# Patient Record
Sex: FEMALE | Race: OTHER | NOT HISPANIC OR LATINO
[De-identification: names, ages, dates, MRNs, and addresses within clinical notes are randomized per-mention and may not be internally consistent; named-entity substitution may affect disease eponyms.]

---

## 2022-05-23 ENCOUNTER — TRANSCRIPTION ENCOUNTER (OUTPATIENT)
Age: 22
End: 2022-05-23

## 2022-05-24 ENCOUNTER — RESULT REVIEW (OUTPATIENT)
Age: 22
End: 2022-05-24

## 2022-05-24 ENCOUNTER — INPATIENT (INPATIENT)
Facility: HOSPITAL | Age: 22
LOS: 5 days | Discharge: HOME | End: 2022-05-30
Attending: OBSTETRICS & GYNECOLOGY | Admitting: OBSTETRICS & GYNECOLOGY
Payer: MEDICAID

## 2022-05-24 ENCOUNTER — TRANSCRIPTION ENCOUNTER (OUTPATIENT)
Age: 22
End: 2022-05-24

## 2022-05-24 VITALS
SYSTOLIC BLOOD PRESSURE: 122 MMHG | TEMPERATURE: 100 F | OXYGEN SATURATION: 100 % | RESPIRATION RATE: 18 BRPM | DIASTOLIC BLOOD PRESSURE: 80 MMHG | HEART RATE: 123 BPM

## 2022-05-24 DIAGNOSIS — K66.1 HEMOPERITONEUM: ICD-10-CM

## 2022-05-24 LAB
ALBUMIN SERPL ELPH-MCNC: 3 G/DL — LOW (ref 3.3–5)
ALBUMIN SERPL ELPH-MCNC: 3.2 G/DL — LOW (ref 3.3–5)
ALP SERPL-CCNC: 56 U/L — SIGNIFICANT CHANGE UP (ref 40–120)
ALP SERPL-CCNC: 64 U/L — SIGNIFICANT CHANGE UP (ref 40–120)
ALT FLD-CCNC: 10 U/L — SIGNIFICANT CHANGE UP (ref 4–33)
ALT FLD-CCNC: 9 U/L — SIGNIFICANT CHANGE UP (ref 4–33)
ANION GAP SERPL CALC-SCNC: 10 MMOL/L — SIGNIFICANT CHANGE UP (ref 7–14)
ANION GAP SERPL CALC-SCNC: 12 MMOL/L — SIGNIFICANT CHANGE UP (ref 7–14)
APPEARANCE UR: CLEAR — SIGNIFICANT CHANGE UP
APTT BLD: 26.8 SEC — LOW (ref 27–36.3)
APTT BLD: 27.5 SEC — SIGNIFICANT CHANGE UP (ref 27–36.3)
APTT BLD: 27.7 SEC — SIGNIFICANT CHANGE UP (ref 27–36.3)
AST SERPL-CCNC: 11 U/L — SIGNIFICANT CHANGE UP (ref 4–32)
AST SERPL-CCNC: 13 U/L — SIGNIFICANT CHANGE UP (ref 4–32)
BASOPHILS # BLD AUTO: 0 K/UL — SIGNIFICANT CHANGE UP (ref 0–0.2)
BASOPHILS NFR BLD AUTO: 0 % — SIGNIFICANT CHANGE UP (ref 0–2)
BILIRUB SERPL-MCNC: 0.3 MG/DL — SIGNIFICANT CHANGE UP (ref 0.2–1.2)
BILIRUB SERPL-MCNC: 1.3 MG/DL — HIGH (ref 0.2–1.2)
BILIRUB UR-MCNC: NEGATIVE — SIGNIFICANT CHANGE UP
BLD GP AB SCN SERPL QL: NEGATIVE — SIGNIFICANT CHANGE UP
BLOOD GAS ARTERIAL - LYTES,HGB,ICA,LACT RESULT: SIGNIFICANT CHANGE UP
BLOOD GAS ARTERIAL COMPREHENSIVE RESULT: SIGNIFICANT CHANGE UP
BLOOD GAS VENOUS COMPREHENSIVE RESULT: SIGNIFICANT CHANGE UP
BUN SERPL-MCNC: 6 MG/DL — LOW (ref 7–23)
BUN SERPL-MCNC: 9 MG/DL — SIGNIFICANT CHANGE UP (ref 7–23)
CALCIUM SERPL-MCNC: 8.3 MG/DL — LOW (ref 8.4–10.5)
CALCIUM SERPL-MCNC: 8.5 MG/DL — SIGNIFICANT CHANGE UP (ref 8.4–10.5)
CHLORIDE SERPL-SCNC: 105 MMOL/L — SIGNIFICANT CHANGE UP (ref 98–107)
CHLORIDE SERPL-SCNC: 107 MMOL/L — SIGNIFICANT CHANGE UP (ref 98–107)
CO2 SERPL-SCNC: 18 MMOL/L — LOW (ref 22–31)
CO2 SERPL-SCNC: 20 MMOL/L — LOW (ref 22–31)
COLOR SPEC: YELLOW — SIGNIFICANT CHANGE UP
CREAT SERPL-MCNC: 0.55 MG/DL — SIGNIFICANT CHANGE UP (ref 0.5–1.3)
CREAT SERPL-MCNC: 0.6 MG/DL — SIGNIFICANT CHANGE UP (ref 0.5–1.3)
D DIMER BLD IA.RAPID-MCNC: 8219 NG/ML DDU — SIGNIFICANT CHANGE UP
DIFF PNL FLD: NEGATIVE — SIGNIFICANT CHANGE UP
EGFR: 129 ML/MIN/1.73M2 — SIGNIFICANT CHANGE UP
EGFR: 132 ML/MIN/1.73M2 — SIGNIFICANT CHANGE UP
EOSINOPHIL # BLD AUTO: 0 K/UL — SIGNIFICANT CHANGE UP (ref 0–0.5)
EOSINOPHIL NFR BLD AUTO: 0 % — SIGNIFICANT CHANGE UP (ref 0–6)
FIBRINOGEN PPP-MCNC: 846 MG/DL — HIGH (ref 330–520)
FLUAV AG NPH QL: SIGNIFICANT CHANGE UP
FLUBV AG NPH QL: SIGNIFICANT CHANGE UP
GLUCOSE SERPL-MCNC: 196 MG/DL — HIGH (ref 70–99)
GLUCOSE SERPL-MCNC: 88 MG/DL — SIGNIFICANT CHANGE UP (ref 70–99)
GLUCOSE UR QL: NEGATIVE — SIGNIFICANT CHANGE UP
HCG SERPL-ACNC: <5 MIU/ML — SIGNIFICANT CHANGE UP
HCT VFR BLD CALC: 25.7 % — LOW (ref 34.5–45)
HCT VFR BLD CALC: 30.9 % — LOW (ref 34.5–45)
HGB BLD-MCNC: 7.4 G/DL — LOW (ref 11.5–15.5)
HGB BLD-MCNC: 9.5 G/DL — LOW (ref 11.5–15.5)
IANC: 14.36 K/UL — HIGH (ref 1.8–7.4)
INR BLD: 1.51 RATIO — HIGH (ref 0.88–1.16)
INR BLD: 1.58 RATIO — HIGH (ref 0.88–1.16)
INR BLD: 1.63 RATIO — HIGH (ref 0.88–1.16)
KETONES UR-MCNC: ABNORMAL
LEUKOCYTE ESTERASE UR-ACNC: NEGATIVE — SIGNIFICANT CHANGE UP
LYMPHOCYTES # BLD AUTO: 0.43 K/UL — LOW (ref 1–3.3)
LYMPHOCYTES # BLD AUTO: 2.6 % — LOW (ref 13–44)
MAGNESIUM SERPL-MCNC: 1.6 MG/DL — SIGNIFICANT CHANGE UP (ref 1.6–2.6)
MCHC RBC-ENTMCNC: 19.8 PG — LOW (ref 27–34)
MCHC RBC-ENTMCNC: 22.7 PG — LOW (ref 27–34)
MCHC RBC-ENTMCNC: 28.8 GM/DL — LOW (ref 32–36)
MCHC RBC-ENTMCNC: 30.7 GM/DL — LOW (ref 32–36)
MCV RBC AUTO: 68.9 FL — LOW (ref 80–100)
MCV RBC AUTO: 73.9 FL — LOW (ref 80–100)
MONOCYTES # BLD AUTO: 1 K/UL — HIGH (ref 0–0.9)
MONOCYTES NFR BLD AUTO: 6.1 % — SIGNIFICANT CHANGE UP (ref 2–14)
NEUTROPHILS # BLD AUTO: 15 K/UL — HIGH (ref 1.8–7.4)
NEUTROPHILS NFR BLD AUTO: 91.3 % — HIGH (ref 43–77)
NITRITE UR-MCNC: NEGATIVE — SIGNIFICANT CHANGE UP
NRBC # BLD: 0 /100 WBCS — SIGNIFICANT CHANGE UP
NRBC # FLD: 0 K/UL — SIGNIFICANT CHANGE UP
PH UR: 6.5 — SIGNIFICANT CHANGE UP (ref 5–8)
PHOSPHATE SERPL-MCNC: 3.2 MG/DL — SIGNIFICANT CHANGE UP (ref 2.5–4.5)
PLATELET # BLD AUTO: 627 K/UL — HIGH (ref 150–400)
PLATELET # BLD AUTO: 647 K/UL — HIGH (ref 150–400)
POTASSIUM SERPL-MCNC: 3.4 MMOL/L — LOW (ref 3.5–5.3)
POTASSIUM SERPL-MCNC: 4 MMOL/L — SIGNIFICANT CHANGE UP (ref 3.5–5.3)
POTASSIUM SERPL-SCNC: 3.4 MMOL/L — LOW (ref 3.5–5.3)
POTASSIUM SERPL-SCNC: 4 MMOL/L — SIGNIFICANT CHANGE UP (ref 3.5–5.3)
PROT SERPL-MCNC: 6.3 G/DL — SIGNIFICANT CHANGE UP (ref 6–8.3)
PROT SERPL-MCNC: 6.7 G/DL — SIGNIFICANT CHANGE UP (ref 6–8.3)
PROT UR-MCNC: ABNORMAL
PROTHROM AB SERPL-ACNC: 17.6 SEC — HIGH (ref 10.5–13.4)
PROTHROM AB SERPL-ACNC: 18.4 SEC — HIGH (ref 10.5–13.4)
PROTHROM AB SERPL-ACNC: 19 SEC — HIGH (ref 10.5–13.4)
RBC # BLD: 3.73 M/UL — LOW (ref 3.8–5.2)
RBC # BLD: 4.18 M/UL — SIGNIFICANT CHANGE UP (ref 3.8–5.2)
RBC # FLD: 21.3 % — HIGH (ref 10.3–14.5)
RBC # FLD: 21.6 % — HIGH (ref 10.3–14.5)
RH IG SCN BLD-IMP: POSITIVE — SIGNIFICANT CHANGE UP
RSV RNA NPH QL NAA+NON-PROBE: SIGNIFICANT CHANGE UP
SARS-COV-2 RNA SPEC QL NAA+PROBE: SIGNIFICANT CHANGE UP
SODIUM SERPL-SCNC: 135 MMOL/L — SIGNIFICANT CHANGE UP (ref 135–145)
SODIUM SERPL-SCNC: 137 MMOL/L — SIGNIFICANT CHANGE UP (ref 135–145)
SP GR SPEC: 1.04 — SIGNIFICANT CHANGE UP (ref 1–1.05)
THROMBIN TIME: 21.4 SEC — SIGNIFICANT CHANGE UP (ref 16–26)
UROBILINOGEN FLD QL: SIGNIFICANT CHANGE UP
WBC # BLD: 16.43 K/UL — HIGH (ref 3.8–10.5)
WBC # BLD: 19.54 K/UL — HIGH (ref 3.8–10.5)
WBC # FLD AUTO: 16.43 K/UL — HIGH (ref 3.8–10.5)
WBC # FLD AUTO: 19.54 K/UL — HIGH (ref 3.8–10.5)

## 2022-05-24 PROCEDURE — 99291 CRITICAL CARE FIRST HOUR: CPT

## 2022-05-24 PROCEDURE — 99221 1ST HOSP IP/OBS SF/LOW 40: CPT

## 2022-05-24 PROCEDURE — 71045 X-RAY EXAM CHEST 1 VIEW: CPT | Mod: 26

## 2022-05-24 PROCEDURE — 58720 REMOVAL OF OVARY/TUBE(S): CPT | Mod: 22

## 2022-05-24 PROCEDURE — 88305 TISSUE EXAM BY PATHOLOGIST: CPT | Mod: 26

## 2022-05-24 PROCEDURE — 58925 REMOVAL OF OVARIAN CYST(S): CPT | Mod: GC,22

## 2022-05-24 PROCEDURE — 49205: CPT | Mod: 22

## 2022-05-24 PROCEDURE — 74174 CTA ABD&PLVS W/CONTRAST: CPT | Mod: 26,MA

## 2022-05-24 RX ORDER — PIPERACILLIN AND TAZOBACTAM 4; .5 G/20ML; G/20ML
3.38 INJECTION, POWDER, LYOPHILIZED, FOR SOLUTION INTRAVENOUS EVERY 8 HOURS
Refills: 0 | Status: DISCONTINUED | OUTPATIENT
Start: 2022-05-24 | End: 2022-05-26

## 2022-05-24 RX ORDER — CHLORHEXIDINE GLUCONATE 213 G/1000ML
1 SOLUTION TOPICAL
Refills: 0 | Status: DISCONTINUED | OUTPATIENT
Start: 2022-05-24 | End: 2022-05-28

## 2022-05-24 RX ORDER — PIPERACILLIN AND TAZOBACTAM 4; .5 G/20ML; G/20ML
3.38 INJECTION, POWDER, LYOPHILIZED, FOR SOLUTION INTRAVENOUS ONCE
Refills: 0 | Status: COMPLETED | OUTPATIENT
Start: 2022-05-24 | End: 2022-05-24

## 2022-05-24 RX ORDER — FENTANYL CITRATE 50 UG/ML
0.5 INJECTION INTRAVENOUS
Qty: 2500 | Refills: 0 | Status: DISCONTINUED | OUTPATIENT
Start: 2022-05-24 | End: 2022-05-25

## 2022-05-24 RX ORDER — HEPARIN SODIUM 5000 [USP'U]/ML
5000 INJECTION INTRAVENOUS; SUBCUTANEOUS EVERY 8 HOURS
Refills: 0 | Status: DISCONTINUED | OUTPATIENT
Start: 2022-05-24 | End: 2022-05-30

## 2022-05-24 RX ORDER — POTASSIUM CHLORIDE 20 MEQ
10 PACKET (EA) ORAL
Refills: 0 | Status: COMPLETED | OUTPATIENT
Start: 2022-05-24 | End: 2022-05-25

## 2022-05-24 RX ORDER — MAGNESIUM SULFATE 500 MG/ML
2 VIAL (ML) INJECTION ONCE
Refills: 0 | Status: DISCONTINUED | OUTPATIENT
Start: 2022-05-24 | End: 2022-05-25

## 2022-05-24 RX ORDER — SODIUM CHLORIDE 9 MG/ML
1000 INJECTION, SOLUTION INTRAVENOUS
Refills: 0 | Status: DISCONTINUED | OUTPATIENT
Start: 2022-05-24 | End: 2022-05-27

## 2022-05-24 RX ORDER — DEXMEDETOMIDINE HYDROCHLORIDE IN 0.9% SODIUM CHLORIDE 4 UG/ML
0.2 INJECTION INTRAVENOUS
Qty: 400 | Refills: 0 | Status: DISCONTINUED | OUTPATIENT
Start: 2022-05-24 | End: 2022-05-25

## 2022-05-24 RX ORDER — ACETAMINOPHEN 500 MG
1000 TABLET ORAL EVERY 6 HOURS
Refills: 0 | Status: COMPLETED | OUTPATIENT
Start: 2022-05-24 | End: 2022-05-25

## 2022-05-24 RX ORDER — HYDROMORPHONE HYDROCHLORIDE 2 MG/ML
0.5 INJECTION INTRAMUSCULAR; INTRAVENOUS; SUBCUTANEOUS EVERY 4 HOURS
Refills: 0 | Status: DISCONTINUED | OUTPATIENT
Start: 2022-05-24 | End: 2022-05-25

## 2022-05-24 RX ORDER — MORPHINE SULFATE 50 MG/1
2 CAPSULE, EXTENDED RELEASE ORAL ONCE
Refills: 0 | Status: DISCONTINUED | OUTPATIENT
Start: 2022-05-24 | End: 2022-05-24

## 2022-05-24 RX ORDER — SODIUM CHLORIDE 9 MG/ML
500 INJECTION INTRAMUSCULAR; INTRAVENOUS; SUBCUTANEOUS ONCE
Refills: 0 | Status: COMPLETED | OUTPATIENT
Start: 2022-05-24 | End: 2022-05-24

## 2022-05-24 RX ORDER — HYDROMORPHONE HYDROCHLORIDE 2 MG/ML
1 INJECTION INTRAMUSCULAR; INTRAVENOUS; SUBCUTANEOUS EVERY 4 HOURS
Refills: 0 | Status: DISCONTINUED | OUTPATIENT
Start: 2022-05-24 | End: 2022-05-25

## 2022-05-24 RX ORDER — CHLORHEXIDINE GLUCONATE 213 G/1000ML
15 SOLUTION TOPICAL EVERY 12 HOURS
Refills: 0 | Status: DISCONTINUED | OUTPATIENT
Start: 2022-05-24 | End: 2022-05-25

## 2022-05-24 RX ADMIN — FENTANYL CITRATE 4.93 MICROGRAM(S)/KG/HR: 50 INJECTION INTRAVENOUS at 22:49

## 2022-05-24 RX ADMIN — PIPERACILLIN AND TAZOBACTAM 200 GRAM(S): 4; .5 INJECTION, POWDER, LYOPHILIZED, FOR SOLUTION INTRAVENOUS at 14:22

## 2022-05-24 RX ADMIN — PIPERACILLIN AND TAZOBACTAM 25 GRAM(S): 4; .5 INJECTION, POWDER, LYOPHILIZED, FOR SOLUTION INTRAVENOUS at 23:49

## 2022-05-24 RX ADMIN — SODIUM CHLORIDE 500 MILLILITER(S): 9 INJECTION INTRAMUSCULAR; INTRAVENOUS; SUBCUTANEOUS at 14:00

## 2022-05-24 RX ADMIN — MORPHINE SULFATE 2 MILLIGRAM(S): 50 CAPSULE, EXTENDED RELEASE ORAL at 14:18

## 2022-05-24 RX ADMIN — DEXMEDETOMIDINE HYDROCHLORIDE IN 0.9% SODIUM CHLORIDE 4.93 MICROGRAM(S)/KG/HR: 4 INJECTION INTRAVENOUS at 23:49

## 2022-05-24 RX ADMIN — FENTANYL CITRATE 4.93 MICROGRAM(S)/KG/HR: 50 INJECTION INTRAVENOUS at 23:49

## 2022-05-24 RX ADMIN — Medication 400 MILLIGRAM(S): at 23:48

## 2022-05-24 RX ADMIN — SODIUM CHLORIDE 125 MILLILITER(S): 9 INJECTION, SOLUTION INTRAVENOUS at 23:49

## 2022-05-24 NOTE — H&P ADULT - ATTENDING COMMENTS
Saw patient with resident. Patient is tachycardic and SOB and very uncomfortable  Explained to patient and her mother need to do diagnostic laparoscopy to see what is in the abdomen either purulent fluid or blood and might need anywhere from cystectomy to oophorectomy and salpingectomy to full hysterectomy. Patient consented and understands the need for urgent surgery.  To start transfusing 1 unit on way to OR

## 2022-05-24 NOTE — CONSULT NOTE ADULT - SUBJECTIVE AND OBJECTIVE BOX
SICU Consultation Note  =====================================================    HPI:  21 yo woman presents for second opinion after signing out AMA from [facility] where she presented on  with abdominal pain and fevers, and admitted for treatment of TOA. Per discussion with [facility] GYN team, pt was started on Abx and plan was initially for IR drainage of TOA. Patient had an MRI done due to elevated , which redemonstrated TOA. However, due to elevated , IR drainage not performed and patient was counseled on surgical intervention. Patient was told she may need removal of her reproductive organs and patient left AMA for a second opinion. Also concern for hemorrhagic cysts due to drop in Hgb from 9.6 -> 7.5 while inpatient. Patient denies needing blood transfusion. Pt reports abdominal pain started two days ago, associated with fevers, chills, nausea and she presented to [place] on  due to worsening pain. Denies vaginal bleeding, discharge, lightheadedness, dizziness, CP, SOB.  She is sexually active. Reports hx of heavy menstrual bleeding requiring blood transfusions and irregular periods, managed on OCPs. In ED, patient tachycardic to 120s, tmax 100.3 with CT angio showing bilateral adnexal cysts, likely hemorrhagic on the right with a right adnexal lesion measuring 11x5.7cm and free fluid in the pelvis concerning for ruptured TOA. No active extrav. Labs showed WBC 16, H&H 7.4/25, INR 1.6 and lactate 1.3. She was taken emergently to the OR by GYN and is now s/p **********     Surgery Information  OR time:      EBL:          IV Fluids:       Blood Products:   UOP:        PAST MEDICAL & SURGICAL HISTORY:  Abnormal uterine bleeding    HOME MEDS:  None    ALLERGIES:  NKDA    SOCIAL:   ADVANCED DIRECTIVES: Presumed Full Code     REVIEW OF SYSTEMS:  [ ] A ten-point review of systems was otherwise negative except as noted.  [x] Due to altered mental status/intubation, subjective information were not able to be obtained from the patient. History was obtained, to the extent possible, from review of the chart and collateral sources of information.      CURRENT MEDICATIONS:   --------------------------------------------------------------------------------------  Neurologic Medications    Respiratory Medications    Cardiovascular Medications    Gastrointestinal Medications    Genitourinary Medications    Hematologic/Oncologic Medications    Antimicrobial/Immunologic Medications    Endocrine/Metabolic Medications    Topical/Other Medications    --------------------------------------------------------------------------------------    VITAL SIGNS, INS/OUTS (last 24 hours):  --------------------------------------------------------------------------------------  ICU Vital Signs Last 24 Hrs  T(C): 37.9 (24 May 2022 12:39), Max: 37.9 (24 May 2022 12:39)  T(F): 100.3 (24 May 2022 12:39), Max: 100.3 (24 May 2022 12:39)  HR: 123 (24 May 2022 12:39) (123 - 123)  BP: 122/80 (24 May 2022 12:39) (122/80 - 122/80)  RR: 18 (24 May 2022 12:39) (18 - 18)  SpO2: 100% (24 May 2022 12:39) (100% - 100%)    --------------------------------------------------------------------------------------    EXAM:    General/Neuro  RASS:   Exam: NAD, sedated    Respiratory  Exam:  Normal expansion/effort.  [] Tracheostomy   [x] Intubated  Mechanical Ventilation:     Cardiovascular  Exam: S1, S2.  Regular rate and rhythm.   Cardiac Rhythm: Normal Sinus Rhythm    GI  Exam: Abdomen soft, non-distended.    Wound:   ***  Current Diet:  NPO***    Tubes/Lines/Drains  ***  [x] Peripheral IV  [] Central Venous Line     	[] R	[] L	[] IJ	[] Fem	[] SC        Type:	    Date Placed:   [x] Arterial Line		[] R	[] L	[] Fem	[] Rad	[] Ax	Date Placed:   [] PICC:         	[] Midline		[] Mediport           [x] Urinary Catheter		Date Placed:     Extremities  Exam: Extremities warm, pink, well-perfused.      Derm:  Exam: Good skin turgor, no skin breakdown.      :   Exam: Harden catheter in place.     --------------------------------------------------------------------------------------  Labs:                        7.4    16.43 )-----------( 647      ( 24 May 2022 14:00 )             25.7       Auto Neutrophil %: 91.3 % (22 @ 14:00)        137  |  107  |  9   ----------------------------<  88  4.0   |  20<L>  |  0.60      Calcium, Total Serum: 8.3 mg/dL (22 @ 14:00)      LFTs:             6.7  | 0.3  | 11       ------------------[64      ( 24 May 2022 14:00 )  3.0  | x    | 10          Blood Gas Arterial, Lactate: 0.8 mmol/L (22 @ 19:45)  Blood Gas Arterial, Lactate: 1.3 mmol/L (22 @ 18:06)  Blood Gas Arterial, Lactate: 1.0 mmol/L (22 @ 17:14)  Blood Gas Venous - Lactate: 0.9 mmol/L (22 @ 14:00)    ABG - ( 24 May 2022 19:45 )  pH: 7.28  /  pCO2: 41    /  pO2: 253   / HCO3: 19    / Base Excess: -7.0  /  SaO2: 100.0     ABG - ( 24 May 2022 18:06 )  pH: 7.34  /  pCO2: 33    /  pO2: 308   / HCO3: 18    / Base Excess: -7.2  /  SaO2: 100.0     ABG - ( 24 May 2022 17:14 )  pH: 7.39  /  pCO2: 31    /  pO2: 221   / HCO3: 19    / Base Excess: -5.6  /  SaO2: 99.1      Coags:     18.4   ----< 1.58    ( 24 May 2022 19:15 )     27.7      Urinalysis Basic - ( 24 May 2022 14:25 )    Color: Yellow / Appearance: Clear / S.042 / pH: x  Gluc: x / Ketone: Moderate  / Bili: Negative / Urobili: <2 mg/dL   Blood: x / Protein: 30 mg/dL / Nitrite: Negative   Leuk Esterase: Negative / RBC: 9 /HPF / WBC 3 /HPF   Sq Epi: x / Non Sq Epi: 3 /HPF / Bacteria: Negative           SICU Consultation Note  =====================================================    HPI:  23 yo woman presents for second opinion after signing out AMA from [facility] where she presented on  with abdominal pain and fevers, and admitted for treatment of TOA. Per discussion with [facility] GYN team, pt was started on Abx and plan was initially for IR drainage of TOA. Patient had an MRI done due to elevated , which redemonstrated TOA. However, due to elevated , IR drainage not performed and patient was counseled on surgical intervention. Patient was told she may need removal of her reproductive organs and patient left AMA for a second opinion. Also concern for hemorrhagic cysts due to drop in Hgb from 9.6 -> 7.5 while inpatient. Patient denies needing blood transfusion. Pt reports abdominal pain started two days ago, associated with fevers, chills, nausea and she presented to [place] on  due to worsening pain. Denies vaginal bleeding, discharge, lightheadedness, dizziness, CP, SOB.  She is sexually active. Reports hx of heavy menstrual bleeding requiring blood transfusions and irregular periods, managed on OCPs. In ED, patient tachycardic to 120s, tmax 100.3 with CT angio showing bilateral adnexal cysts, likely hemorrhagic on the right with a right adnexal lesion measuring 11x5.7cm and free fluid in the pelvis concerning for ruptured TOA. No active extrav. Labs showed WBC 16, H&H 7.4/25, INR 1.6 and lactate 1.3. She was taken emergently to the OR by GYN and is now s/p diagnostic laparoscopy, exploratory laparotomy, right salpingo-oopherectomy, left cystectomy and evacuation of 1L purulence from abdomen. Patient desatted to 50s post-intubated and was intermittently on pressors intraoperatively. She was left intubated and brought to the SICU for further resuscitation and ventilatory support.    Surgery Information  EBL 200ml  IVF 2900ml  Albumin 500ml  UOP 830ml  2u PRBC  2u cryoprecipitate    PAST MEDICAL & SURGICAL HISTORY:  Abnormal uterine bleeding    HOME MEDS:  None    ALLERGIES:  NKDA    SOCIAL:   ADVANCED DIRECTIVES: Presumed Full Code     REVIEW OF SYSTEMS:  [ ] A ten-point review of systems was otherwise negative except as noted.  [x] Due to altered mental status/intubation, subjective information were not able to be obtained from the patient. History was obtained, to the extent possible, from review of the chart and collateral sources of information.      CURRENT MEDICATIONS:   --------------------------------------------------------------------------------------  Neurologic Medications    Respiratory Medications    Cardiovascular Medications    Gastrointestinal Medications    Genitourinary Medications    Hematologic/Oncologic Medications    Antimicrobial/Immunologic Medications    Endocrine/Metabolic Medications    Topical/Other Medications    --------------------------------------------------------------------------------------    VITAL SIGNS, INS/OUTS (last 24 hours):  --------------------------------------------------------------------------------------  ICU Vital Signs Last 24 Hrs  T(C): 37.9 (24 May 2022 12:39), Max: 37.9 (24 May 2022 12:39)  T(F): 100.3 (24 May 2022 12:39), Max: 100.3 (24 May 2022 12:39)  HR: 123 (24 May 2022 12:39) (123 - 123)  BP: 122/80 (24 May 2022 12:39) (122/80 - 122/80)  RR: 18 (24 May 2022 12:39) (18 - 18)  SpO2: 100% (24 May 2022 12:39) (100% - 100%)    --------------------------------------------------------------------------------------    EXAM:    General/Neuro  RASS: -3  Exam: NAD, sedated    Respiratory  Exam:  Normal expansion/effort.  [] Tracheostomy   [x] Intubated  Mechanical Ventilation: volume a/c 400/15/8/50    Cardiovascular  Exam: S1, S2.  Sinus tachycardia  Cardiac Rhythm: Normal Sinus Rhythm    GI  Exam: Abdomen soft, non-distended. Midline incision with dressing c/d/i  Current Diet:  NPO/OGT    Tubes/Lines/Drains    [x] Peripheral IV  [] Central Venous Line     	[] R	[] L	[] IJ	[] Fem	[] SC        Type:	    Date Placed:   [x] Arterial Line		[x] L	[] Fem	[x] Rad	[] Ax	Date Placed:   [] PICC:         	[] Midline		[] Mediport           [x] Urinary Catheter		Date Placed:     Extremities  Exam: Extremities warm, pink, well-perfused.      Derm:  Exam: Good skin turgor, no skin breakdown.      :   Exam: Harden catheter in place.     --------------------------------------------------------------------------------------  Labs:                        7.4    16.43 )-----------( 647      ( 24 May 2022 14:00 )             25.7       Auto Neutrophil %: 91.3 % (22 @ 14:00)        137  |  107  |  9   ----------------------------<  88  4.0   |  20<L>  |  0.60      Calcium, Total Serum: 8.3 mg/dL (22 @ 14:00)      LFTs:             6.7  | 0.3  | 11       ------------------[64      ( 24 May 2022 14:00 )  3.0  | x    | 10          Blood Gas Arterial, Lactate: 0.8 mmol/L (22 @ 19:45)  Blood Gas Arterial, Lactate: 1.3 mmol/L (22 @ 18:06)  Blood Gas Arterial, Lactate: 1.0 mmol/L (22 @ 17:14)  Blood Gas Venous - Lactate: 0.9 mmol/L (22 @ 14:00)    ABG - ( 24 May 2022 19:45 )  pH: 7.28  /  pCO2: 41    /  pO2: 253   / HCO3: 19    / Base Excess: -7.0  /  SaO2: 100.0     ABG - ( 24 May 2022 18:06 )  pH: 7.34  /  pCO2: 33    /  pO2: 308   / HCO3: 18    / Base Excess: -7.2  /  SaO2: 100.0     ABG - ( 24 May 2022 17:14 )  pH: 7.39  /  pCO2: 31    /  pO2: 221   / HCO3: 19    / Base Excess: -5.6  /  SaO2: 99.1      Coags:     18.4   ----< 1.58    ( 24 May 2022 19:15 )     27.7      Urinalysis Basic - ( 24 May 2022 14:25 )    Color: Yellow / Appearance: Clear / S.042 / pH: x  Gluc: x / Ketone: Moderate  / Bili: Negative / Urobili: <2 mg/dL   Blood: x / Protein: 30 mg/dL / Nitrite: Negative   Leuk Esterase: Negative / RBC: 9 /HPF / WBC 3 /HPF   Sq Epi: x / Non Sq Epi: 3 /HPF / Bacteria: Negative

## 2022-05-24 NOTE — CONSULT NOTE ADULT - ATTENDING COMMENTS
I have examined this patient, reviewed pertinent labs and imaging on SICU rounds.    40  minutes in total were spent in providing direct critical care for the diagnoses, assessment and plan outlined below.  This patient suffers from a critical illness that acutely impairs one or more vital organ systems such that there is a high probability of life threatening or imminent deterioration of the patient's condition.  These diagnoses are unrelated to the surgical procedure.    Additionally, time spent in teaching or the performance of separately billable procedures was not counted toward my critical care time.  There is no overlap.  Time included review of vitals, labs, imaging, discussion with consultants/surrogate decision-makers, and coordination with the operating room.    22F with septic shock due to tuboovarian abscesses requiring postoperative resuscitation and ventilator management.  Optimize sedation to vent synchrony.  Assess fluid status with POCUS and administer pressors I have examined this patient, reviewed pertinent labs and imaging on SICU rounds.    40  minutes in total were spent in providing direct critical care for the diagnoses, assessment and plan outlined below.  This patient suffers from a critical illness that acutely impairs one or more vital organ systems such that there is a high probability of life threatening or imminent deterioration of the patient's condition.  These diagnoses are unrelated to the surgical procedure.    Additionally, time spent in teaching or the performance of separately billable procedures was not counted toward my critical care time.  There is no overlap.  Time included review of vitals, labs, imaging, discussion with consultants/surrogate decision-makers, and coordination with the operating room.    22F with septic shock due to tuboovarian abscesses 2/2 infected endometriomas requiring postoperative resuscitation and ventilator management.  Optimize sedation to vent synchrony; ideally fentanyl infusion for pain control and minimize propofol/precedex for hypotension.  Adjust oxygenation support parameters to SaO2>94%, check CXR and assess for readiness for SBT in 6 hrs.  Assess fluid status with POCUS and administer pressors as needed.  Antibiotics.  VTE prophylaxis.

## 2022-05-24 NOTE — ED PROVIDER NOTE - OBJECTIVE STATEMENT
21 yo F PMHx of, presenting to ED from [facility] after leaving from inpatient admission - states she wanted a 2nd opinion regarding management because she was told one of her fallopian tubes would have to be removed. Admitted to [place] 3 days ago for abd pain, worsening over 3-4 weeks. Described as cramping. Pt found to have ovarian abscess x 2 and free fluid on imaging there. Associated back pain. Denies vaginal bleeding or discharge. Previously w/ vomiting 3 days ago, none since. Also reports pain radiating to chest w/ breathing, fevers, and generalized weakness. LMP 5/1/22. No past abd surgeries. NKDA. 21 yo F PMHx of, presenting to ED from [facility] after leaving from inpatient admission for tubo-ovarian abscess - states she wanted a 2nd opinion regarding management because she was told one of her fallopian tubes would have to be removed. Admitted to [place] 3 days ago for abd pain, worsening over 3-4 weeks. Described as cramping. Also reports pain radiating to chest w/ breathing, back pain, fevers, and generalized weakness. Found to have tubo-ovarian abscess x 2 w/ free fluid on CT and MRI. Also found to have free fluid present today at which point surgery was suggested. Pt denies vaginal bleeding or discharge. Previously w/ vomiting 3 days ago, none since. LMP 5/1/22. No past abd surgeries. NKDA. Sexually active w/ single partner.

## 2022-05-24 NOTE — CONSULT NOTE ADULT - ASSESSMENT
21 yo woman admitted with a ruptured tubo-ovarian abscess now s/p *********    PLAN:    NEURO:  - AOx  - Pain control with     RESP:  - Intubated    CV:  - MAP goal >65    GI:  - Diet:   - Bowel regimen    :  - Trend electrolytes  - Monitor UOP    ID:  -     HEME:  - DVT ppx with  - Trend H&H    ENDO:  - Monitor glucose on BMP    TUBES/LINES/DRAINS:  -     DISPO: SICU   21 yo woman admitted with a ruptured tubo-ovarian abscess now s/p diagnostic laparoscopy, exploratory laparotomy, right salpingo-oopherectomy, left cystectomy and evacuation of 1L purulence from abdomen.     PLAN:    NEURO:  - AOx3 at baseline, currently sedated  - Will wean propofol and transition to fentanyl gtt for pain control and sedation  - IV tylenol ATC and dilaudid IVP PRN    RESP:  - Intubated, volume A/C 400/18/5/40  - On arrival to SICU, gas with respiratory and metabolic acidosis, satting well -> increased RR to 18, decreased PEEP to 5 and FiO2 to 40%  - STAT CXR to confirm ETT  - AM CXR  - R pleural effusion on CT chest, will continue to monitor    CV:  - MAP goal >65  - Levo titrated off as propofol was discontinued  - Euvolemic on POCUS  - Lactate uptrended to 3.2, will trend and give additional IVF if needed    GI:  - Diet: NPO  - OGT to LCWS, will confirm position on CXR    :  - Trend electrolytes  - Monitor UOP  - Indwelling chowdhury catheter  - LR@125ml/hr    ID:  - OR cultures sent, will f/u results  - BCx 5/24 collected  - UA negative, UCx 5/24 collected  - GC/CT pending  - Continue zosyn    HEME:  - DVT ppx with SQH  - H&H stable  - s/p 2u PRBC, 2u cryo intraop    ENDO:  - Monitor glucose on BMP    TUBES/LINES/DRAINS:  - PIVs  - Chowdhury  - ETT  - L radial a-line    DISPO: SICU   23 yo woman admitted with a ruptured tubo-ovarian abscess now s/p diagnostic laparoscopy, exploratory laparotomy, right salpingo-oopherectomy, left cystectomy and evacuation of 1L purulence from abdomen (5/24).    PLAN:    NEURO:  - AOx3 at baseline, currently sedated  - Will wean propofol and transition to fentanyl gtt for pain control and sedation  - IV tylenol ATC and dilaudid IVP PRN    RESP:  - Intubated, volume A/C 400/18/5/40  - On arrival to SICU, gas with respiratory and metabolic acidosis, satting well -> increased RR to 18, decreased PEEP to 5 and FiO2 to 40%  - STAT CXR to confirm ETT  - AM CXR  - R pleural effusion on CT chest, will continue to monitor    CV:  - MAP goal >65  - Levo titrated off as propofol was discontinued  - Euvolemic on POCUS  - Lactate uptrended to 3.2, will trend and give additional IVF if needed    GI:  - Diet: NPO  - OGT to LCWS, will confirm position on CXR    :  - Trend electrolytes  - Monitor UOP  - Indwelling chowdhury catheter  - LR@125ml/hr    ID:  - OR cultures sent, will f/u results  - BCx 5/24 collected  - UA negative, UCx 5/24 collected  - GC/CT pending  - Continue zosyn    HEME:  - DVT ppx with SQH  - H&H stable  - s/p 2u PRBC, 2u cryo intraop    ENDO:  - Monitor glucose on BMP    TUBES/LINES/DRAINS:  - PIVs  - Chowdhury  - ETT  - L radial a-line    DISPO: SICU

## 2022-05-24 NOTE — H&P ADULT - NSHPPHYSICALEXAM_GEN_ALL_CORE
General: sitting comfortably in bed, NAD, AOx3   CV: tachycardic   Lungs: nonlabored breathing    Abd: Obese, distended, diffusely tender      Pelvic: to be performed   Ext: non-tender b/l, no edema

## 2022-05-24 NOTE — ED PROVIDER NOTE - ATTENDING CONTRIBUTION TO CARE
22 year old female came to the ED from [facility] after she left because she wanted a second opinion here for OA. Pt with abd pain, fever and generalized weakness. Peritoneal signs on exam. GYN consulted and pt to be admitted

## 2022-05-24 NOTE — ED ADULT NURSE NOTE - OBJECTIVE STATEMENT
Pt A&Ox4, ambulatory at baseline. Pt presents to ED with abd pain and distension, febrile, tachycardic, and tachypneic. ABD pain described as diffuse and 8/10; abd tender but not firm. Pt c/o lower back pain. PMHx of PCOS, pt takes BC pills and her LMP was 5/1. Pt went to [facility], the MDs there told her she needed surgery. Pt came to [facility] for a second opinion. 2 20G IVs placed above L and R ACs, labs drawn and sent, family at bedside. Medication given per MD. OB consult in progress. Awaiting results. Pt to CT.

## 2022-05-24 NOTE — H&P ADULT - NSHPLABSRESULTS_GEN_ALL_CORE
Vital Signs Last 24 Hrs  T(C): 37.9 (24 May 2022 12:39), Max: 37.9 (24 May 2022 12:39)  T(F): 100.3 (24 May 2022 12:39), Max: 100.3 (24 May 2022 12:39)  HR: 123 (24 May 2022 12:39) (123 - 123)  BP: 122/80 (24 May 2022 12:39) (122/80 - 122/80)  BP(mean): --  RR: 18 (24 May 2022 12:39) (18 - 18)  SpO2: 100% (24 May 2022 12:39) (100% - 100%)                          7.4    16.43 )-----------( 647      ( 24 May 2022 14:00 )             25.7           PT/INR - ( 24 May 2022 14:00 )   PT: 19.0 sec;   INR: 1.63 ratio         PTT - ( 24 May 2022 14:00 )  PTT:27.5 sec

## 2022-05-24 NOTE — BRIEF OPERATIVE NOTE - NSICDXBRIEFPOSTOP_GEN_ALL_CORE_FT
POST-OP DIAGNOSIS:  Tubal ovarian abscess 24-May-2022 21:58:30  Chau Self  Endometriosis 24-May-2022 21:58:37  Chau Self

## 2022-05-24 NOTE — H&P ADULT - HISTORY OF PRESENT ILLNESS
23 yo G0 LMP 5/1 presents for second opinion after signing out AMA from [facility] where she presented on 5/22 with abdominal pain and fevers, and admitted for treatment of TOA.      Per discussion with [facility] GYN team, pt was started on Abx and plan was initially for IR drainage of TOA. Patient had an MRI done due to elevated , which redemonstrated TOA. However, due to elevated , IR drainage not performed and patient was counseled on surgical intervention. Patient was told she may need removal of her reproductive organs and patient left AMA for a second opinion. Also concern for hemorrhagic cysts due to drop in Hgb from 9.6 -> 7.5 while inpatient. Patient denies needing blood transfusion.     Pt reports abdominal pain started two days ago, associated with fevers, chills, nausea and she presented to [place] on Sunday 5/22 due to worsening pain. Denies vaginal bleeding, discharge, lightheadedness, dizziness, CP, SOB.  She is sexually active. Reports hx of heavy menstrual bleeding requiring blood transfusions and irregular periods, managed on OCPs.     GYN Provider: [facility]  OBhx: G0    Gynhx: HMB, irregular periods, managed with OCPs. Denies fibroids, ovarian cysts, endometriosis, STIs  PMhx: Anemia   PSHx: denies   Social: denies tobacco or recreational drugs. Reports social alcohol use.   Meds: OCPs  Allergies: No Known Allergies

## 2022-05-24 NOTE — BRIEF OPERATIVE NOTE - OPERATION/FINDINGS
Diagnostic laparoscopy performed with dense purulent adhesions of omentum noted to abdominal wall. Decision made to convert to ex-lap due to poor visualization from dense adhesions and limited mobility of pelvic structures. 1L of purulent thick brown fluid evacuated from abdomen. 10cm right adnexal mass with intraop rupture revealing chocolate colored fluid consistent with endometrioma. Tortuous, edematous right fallopian tube. 5cm left ovarian cyst. Grossly normal appearing uterus.

## 2022-05-24 NOTE — ED PROVIDER NOTE - PROGRESS NOTE DETAILS
Mustapha, PGY3: gyn consulted for concern of ruptured toa. will see pt in ED Kurt Spain PGY2: OBGYN team in department. CT read pending.

## 2022-05-24 NOTE — ED ADULT TRIAGE NOTE - CHIEF COMPLAINT QUOTE
Pt recently dx with tubo-ovarian abscess at [facility] discharge today, c/o increasing abd pain , dizziness, weakness , fever, back pain, difficulty ambulation.  CT; MRI done.  Pt scheduled for  draining procedure today to remove fluids , postponed due to other complications.  Pt reports internal hemorrhaging  as pet testing .

## 2022-05-24 NOTE — ED PROVIDER NOTE - CLINICAL SUMMARY MEDICAL DECISION MAKING FREE TEXT BOX
21 yo F PMHx of, presenting to ED from [facility] after leaving from inpatient admission for tubo-ovarian abscess - states she wanted a 2nd opinion regarding management because she was told one of her fallopian tubes would have to be removed. Admitted to [facility] 3 days ago for abd pain, worsening over 3-4 weeks. Described as cramping. Also reports pain radiating to chest w/ breathing, back pain, fevers, and generalized weakness. Found to have tubo-ovarian abscess x 2 w/ free fluid on CT and MRI. Also found to have free fluid present today at which point surgery was suggested. Pt denies vaginal bleeding or discharge. Previously w/ vomiting 3 days ago, none since. LMP 5/1/22. No past abd surgeries. NKDA. Sexually active w/ single partner. Exam as above. Concern for PID, tubo-ovarian abscess, intraabdominal bleeding. Consider ectopic, ruptured cyst. Labs, CT imaging, abx, symptomatic tx. Will reassess.

## 2022-05-24 NOTE — H&P ADULT - REASON FOR ADMISSION
[facility]    Date of Service: 2017    Chief Complaint  54 y.o. male admitted 2017 with hematemesis.    Interval Problem Update  Had EGD with esophageal varices, grade IV, now s/p banding.  Pt has received blood transfusions.  No sign of current bleeding.  Pt seen in ICU, ICU care given.  Discussed patient condition and plan with RN, RT and charge nurse / hot rounds.      Consultants/Specialty  GI - Dr Terry    Disposition  Patient is critically ill.   The patient is having: GI bleed  The vital organ system that is effected is the: all are at risk  If untreated there is a high chance of deterioration into: shock and death   The critical care that I am providing today is: protonix/octreotide gtts  The critical care that has been undertaken is medically complex.   There has been no overlap in critical care time.  Critical care time not including procedures, no overlap: 45 minutes        Review of Systems   Unable to perform ROS: mental acuity      Physical Exam  Laboratory/Imaging   Hemodynamics  Temp (24hrs), Av.8 °C (98.3 °F), Min:36.4 °C (97.5 °F), Max:37.2 °C (99 °F)   Temperature: 37.2 °C (99 °F)  Pulse  Av.8  Min: 64  Max: 131 Heart Rate (Monitored): 91  NIBP: 122/71 mmHg      Respiratory      Respiration: (!) 70, Pulse Oximetry: 98 %        RUL Breath Sounds: Clear, RML Breath Sounds: Clear;Diminished, RLL Breath Sounds: Clear;Diminished, LESLEY Breath Sounds: Clear, LLL Breath Sounds: Clear;Diminished    Fluids    Intake/Output Summary (Last 24 hours) at 17 0933  Last data filed at 17 0600   Gross per 24 hour   Intake   2280 ml   Output    100 ml   Net   2180 ml       Nutrition  Orders Placed This Encounter   Procedures   • DIET ORDER     Standing Status: Standing      Number of Occurrences: 1      Standing Expiration Date:      Order Specific Question:  Diet:     Answer:  Low Fiber(GI Soft) [2]     Order Specific Question:  Texture/Fiber modifications:      Answer:  Dysphagia 3(Mechanical Soft)specify fluid consistency(question 6) [3]     Physical Exam   Constitutional: No distress.   HENT:   Head: Normocephalic and atraumatic.   Eyes: Right eye exhibits no discharge. Left eye exhibits no discharge.   Neck: Neck supple. No tracheal deviation present.   Cardiovascular: Normal rate and regular rhythm.  Exam reveals no gallop and no friction rub.    No murmur heard.  Pulmonary/Chest: Effort normal and breath sounds normal. No stridor. No respiratory distress. He has no wheezes. He has no rales.   Abdominal: Soft. Bowel sounds are normal. He exhibits no distension.   Musculoskeletal: He exhibits no edema.   Lymphadenopathy:     He has no cervical adenopathy.   Neurological:   Somnolent and confused    Skin: Skin is warm and dry. He is not diaphoretic.   Psychiatric: He is noncommunicative.   Nursing note and vitals reviewed.      Recent Labs      07/23/17   0410  07/25/17   0530   WBC  4.3*  3.2*   RBC  3.02*  3.00*   HEMOGLOBIN  8.6*  8.5*   HEMATOCRIT  27.0*  26.8*   MCV  89.4  89.3   MCH  28.5  28.3   MCHC  31.9*  31.7*   RDW  59.7*  59.1*   PLATELETCT  50*  51*   MPV  12.8  12.8     Recent Labs      07/23/17   0410  07/25/17   0530   SODIUM  140  142   POTASSIUM  3.7  3.6   CHLORIDE  114*  114*   CO2  24  24   GLUCOSE  92  101*   BUN  8  8   CREATININE  0.57  0.59   CALCIUM  8.2*  8.1*                      Assessment/Plan     * Hypovolemic shock (CMS-HCC)  Assessment & Plan  - resolved     Acute gastrointestinal hemorrhage  Assessment & Plan  - resolved with banding  - monitor     ARF (acute renal failure) (CMS-HCC)  Assessment & Plan  - resolved     Acute blood loss anemia  Assessment & Plan  - resolved, hgb stable    Cirrhosis (CMS-HCC) (present on admission)  Assessment & Plan  - with hepatic encephalopathy  - continue rifaximin and lactulose     Alcoholism (CMS-HCC)  Assessment & Plan  - continue ciwa and librium  - much less agitated  - somnolent currently     Low  serum magnesium level  Assessment & Plan  - resolved     Pain of upper abdomen  Assessment & Plan  - unable to assess d/t AMS    Fever  Assessment & Plan  - resolved    Hypokalemia  Assessment & Plan  - resolved       Labs reviewed and Medications reviewed  Allan catheter: No Allan        DVT prophylaxis - mechanical: SCDs  Ulcer prophylaxis: Yes            Addendum: pt had significant bloody diarrhea suddenly. Will get stat hgb and check q6. Will also start protonix/octreotide gtt. Called and left a message for Dr Mcdaniel who is on call.   Hemorrhagic cysts, TOA

## 2022-05-24 NOTE — H&P ADULT - ASSESSMENT
21 yo G0 LMP 5/1 presents for a second opinion on management of TOA. Pt previously admitted to [facility] 5/22, found to have TOA on imaging. Pt was recommended surgical intervention due to elevated  and no IR drainage performed.       - Concern for hemorrhagic cysts at [facility] due to drop Hgb 9.6 ->7.5. Repeat Hgb at [facility] stable at 7.4. Patient overall clinically stable appearing and low concern for active hemorrhage clinically.    - Loculated collections noted on bedside sono, concerning for TOA. Patient tachycardic to 120s with temp 37.9.   - Cefotetan/Doxy/Flagyl for TOA   - Elevated  at [facility], however  nonspecific and can be elevated in inflammatory setting. Low concern for malignancy given patient age and risk stratification.   - F/u CTAP   - Obtain [facility] records, record release forms faxed   - F/u IR recs for IR drainage    21 yo G0 LMP 5/1 presents for a second opinion on management of TOA. Pt previously admitted to [facility] 5/22, found to have TOA on imaging. Pt was recommended surgical intervention due to elevated  and no IR drainage performed at [facility].      - Admit to GYN   - NPO, LR@125  - COVID swab   - Concern for hemorrhagic cysts at [facility] due to drop Hgb 9.6 ->7.5. Repeat Hgb at [facility] stable at 7.4. However patient with distended abdomen, +FAST scan and tachycardia, concerning for hemoperitoneum.   - Emergent add-on to OR for diagnostic laparoscopy, evacuation of hemoperitoneum, possible salpingectomy, oophorectomy for TOA.   - Cefotetan/Doxy/Flagyl for TOA   - Elevated  at [facility] [place] [facility], however  nonspecific and can be elevated in inflammatory setting. Low concern for malignancy given patient age and risk stratification.   - F/u CTAP   - Obtain [facility] [place] [facility] records, record release forms faxed

## 2022-05-24 NOTE — ED PROVIDER NOTE - PHYSICAL EXAMINATION
Gen: A&Ox4   HEENT: Atraumatic. Mucous membranes moist, no scleral icterus.  CV: Tachy. No significant lower extremity edema.   Resp: Respirations unlabored. CTAB, no rales, no wheezes.  GI: Abdomen diffusely ttp, mildly distended.  Skin/MSK: No open wounds. No ecchymosis appreciated.  Neuro: Following commands. EOMI. Pupils ERRL.  Psych: Appropriate mood, cooperative

## 2022-05-24 NOTE — ED PROVIDER NOTE - NS ED ROS FT
Gen: +fever.   HEENT: Denies headache. Denies congestion.  CV: Denies chest pain. +lightheadedness.  Skin: Denies rash.   Resp: Denies SOB. Denies cough.  GI: +abd pain. +nausea. Denies vomiting. Denies diarrhea. Denies melena. Denies hematochezia.  Msk: Denies extremity swelling. Denies extremity pain.  : Denies dysuria. Denies hematuria.  Neuro: Denies LOC. Denies dizziness. Denies new numbness/tingling. Denies new focal weakness.  Psych: Denies SI

## 2022-05-24 NOTE — BRIEF OPERATIVE NOTE - NSICDXBRIEFPROCEDURE_GEN_ALL_CORE_FT
PROCEDURES:  Diagnostic laparoscopy 24-May-2022 21:56:58  Chau Self  Right salpingoophorectomy 24-May-2022 21:57:14  Chau Self  Open left ovarian cystectomy 24-May-2022 21:57:26  Chau Self

## 2022-05-24 NOTE — ED ADULT NURSE NOTE - CHIEF COMPLAINT QUOTE
Pt recently dx with tubo-ovarian abscess at [facility] discharge today, c/o increasing abd pain , dizziness, weakness , fever, back pain, difficulty ambulation.  CT; MRI done.  Pt scheduled for  draining procedure today to remove fluids , postponed due to other complications.  Pt reports internal hemorrhaging  as pet testing .

## 2022-05-24 NOTE — ED ADULT NURSE REASSESSMENT NOTE - NS ED NURSE REASSESS COMMENT FT1
Pt reports pain slightly improved, report given to OR nurse, family taking all belongings. OB/GYN at bedside, pt verbalizes understanding of procedure.

## 2022-05-25 LAB
A1C WITH ESTIMATED AVERAGE GLUCOSE RESULT: 5.1 % — SIGNIFICANT CHANGE UP (ref 4–5.6)
ALBUMIN SERPL ELPH-MCNC: 2.8 G/DL — LOW (ref 3.3–5)
ALP SERPL-CCNC: 51 U/L — SIGNIFICANT CHANGE UP (ref 40–120)
ALT FLD-CCNC: 10 U/L — SIGNIFICANT CHANGE UP (ref 4–33)
ANION GAP SERPL CALC-SCNC: 11 MMOL/L — SIGNIFICANT CHANGE UP (ref 7–14)
APTT BLD: 26.9 SEC — LOW (ref 27–36.3)
AST SERPL-CCNC: 9 U/L — SIGNIFICANT CHANGE UP (ref 4–32)
BILIRUB SERPL-MCNC: 0.8 MG/DL — SIGNIFICANT CHANGE UP (ref 0.2–1.2)
BLOOD GAS ARTERIAL - LYTES,HGB,ICA,LACT RESULT: SIGNIFICANT CHANGE UP
BUN SERPL-MCNC: 5 MG/DL — LOW (ref 7–23)
CALCIUM SERPL-MCNC: 8.5 MG/DL — SIGNIFICANT CHANGE UP (ref 8.4–10.5)
CHLORIDE SERPL-SCNC: 104 MMOL/L — SIGNIFICANT CHANGE UP (ref 98–107)
CO2 SERPL-SCNC: 19 MMOL/L — LOW (ref 22–31)
CREAT SERPL-MCNC: 0.56 MG/DL — SIGNIFICANT CHANGE UP (ref 0.5–1.3)
CULTURE RESULTS: NO GROWTH — SIGNIFICANT CHANGE UP
EGFR: 131 ML/MIN/1.73M2 — SIGNIFICANT CHANGE UP
ESTIMATED AVERAGE GLUCOSE: 100 — SIGNIFICANT CHANGE UP
GLUCOSE SERPL-MCNC: 210 MG/DL — HIGH (ref 70–99)
GRAM STN FLD: SIGNIFICANT CHANGE UP
HCT VFR BLD CALC: 27.7 % — LOW (ref 34.5–45)
HGB BLD-MCNC: 8.6 G/DL — LOW (ref 11.5–15.5)
INR BLD: 1.4 RATIO — HIGH (ref 0.88–1.16)
MAGNESIUM SERPL-MCNC: 1.7 MG/DL — SIGNIFICANT CHANGE UP (ref 1.6–2.6)
MCHC RBC-ENTMCNC: 22.6 PG — LOW (ref 27–34)
MCHC RBC-ENTMCNC: 31 GM/DL — LOW (ref 32–36)
MCV RBC AUTO: 72.9 FL — LOW (ref 80–100)
NIGHT BLUE STAIN TISS: SIGNIFICANT CHANGE UP
NRBC # BLD: 0 /100 WBCS — SIGNIFICANT CHANGE UP
NRBC # FLD: 0 K/UL — SIGNIFICANT CHANGE UP
PHOSPHATE SERPL-MCNC: 2.9 MG/DL — SIGNIFICANT CHANGE UP (ref 2.5–4.5)
PLATELET # BLD AUTO: 475 K/UL — HIGH (ref 150–400)
POTASSIUM SERPL-MCNC: 3.9 MMOL/L — SIGNIFICANT CHANGE UP (ref 3.5–5.3)
POTASSIUM SERPL-SCNC: 3.9 MMOL/L — SIGNIFICANT CHANGE UP (ref 3.5–5.3)
PROT SERPL-MCNC: 5.9 G/DL — LOW (ref 6–8.3)
PROTHROM AB SERPL-ACNC: 16.3 SEC — HIGH (ref 10.5–13.4)
RBC # BLD: 3.8 M/UL — SIGNIFICANT CHANGE UP (ref 3.8–5.2)
RBC # FLD: 20.9 % — HIGH (ref 10.3–14.5)
SODIUM SERPL-SCNC: 134 MMOL/L — LOW (ref 135–145)
SPECIMEN SOURCE: SIGNIFICANT CHANGE UP
WBC # BLD: 18.52 K/UL — HIGH (ref 3.8–10.5)
WBC # FLD AUTO: 18.52 K/UL — HIGH (ref 3.8–10.5)

## 2022-05-25 PROCEDURE — 71045 X-RAY EXAM CHEST 1 VIEW: CPT | Mod: 26

## 2022-05-25 PROCEDURE — 99291 CRITICAL CARE FIRST HOUR: CPT | Mod: 25

## 2022-05-25 RX ORDER — SODIUM CHLORIDE 9 MG/ML
500 INJECTION, SOLUTION INTRAVENOUS ONCE
Refills: 0 | Status: COMPLETED | OUTPATIENT
Start: 2022-05-25 | End: 2022-05-25

## 2022-05-25 RX ORDER — OXYCODONE HYDROCHLORIDE 5 MG/1
5 TABLET ORAL EVERY 6 HOURS
Refills: 0 | Status: DISCONTINUED | OUTPATIENT
Start: 2022-05-25 | End: 2022-05-25

## 2022-05-25 RX ORDER — ACETAMINOPHEN 500 MG
975 TABLET ORAL EVERY 6 HOURS
Refills: 0 | Status: DISCONTINUED | OUTPATIENT
Start: 2022-05-25 | End: 2022-05-30

## 2022-05-25 RX ORDER — ONDANSETRON 8 MG/1
4 TABLET, FILM COATED ORAL ONCE
Refills: 0 | Status: COMPLETED | OUTPATIENT
Start: 2022-05-25 | End: 2022-05-25

## 2022-05-25 RX ORDER — MAGNESIUM SULFATE 500 MG/ML
2 VIAL (ML) INJECTION ONCE
Refills: 0 | Status: COMPLETED | OUTPATIENT
Start: 2022-05-25 | End: 2022-05-25

## 2022-05-25 RX ORDER — OXYCODONE HYDROCHLORIDE 5 MG/1
5 TABLET ORAL EVERY 6 HOURS
Refills: 0 | Status: DISCONTINUED | OUTPATIENT
Start: 2022-05-25 | End: 2022-05-26

## 2022-05-25 RX ORDER — KETOROLAC TROMETHAMINE 30 MG/ML
15 SYRINGE (ML) INJECTION EVERY 6 HOURS
Refills: 0 | Status: DISCONTINUED | OUTPATIENT
Start: 2022-05-25 | End: 2022-05-26

## 2022-05-25 RX ADMIN — Medication 1000 MILLIGRAM(S): at 17:41

## 2022-05-25 RX ADMIN — PIPERACILLIN AND TAZOBACTAM 25 GRAM(S): 4; .5 INJECTION, POWDER, LYOPHILIZED, FOR SOLUTION INTRAVENOUS at 05:35

## 2022-05-25 RX ADMIN — Medication 400 MILLIGRAM(S): at 13:06

## 2022-05-25 RX ADMIN — Medication 100 MILLIEQUIVALENT(S): at 02:25

## 2022-05-25 RX ADMIN — Medication 25 GRAM(S): at 03:28

## 2022-05-25 RX ADMIN — HEPARIN SODIUM 5000 UNIT(S): 5000 INJECTION INTRAVENOUS; SUBCUTANEOUS at 05:35

## 2022-05-25 RX ADMIN — Medication 15 MILLIGRAM(S): at 19:13

## 2022-05-25 RX ADMIN — Medication 400 MILLIGRAM(S): at 05:35

## 2022-05-25 RX ADMIN — Medication 1000 MILLIGRAM(S): at 05:50

## 2022-05-25 RX ADMIN — HEPARIN SODIUM 5000 UNIT(S): 5000 INJECTION INTRAVENOUS; SUBCUTANEOUS at 13:06

## 2022-05-25 RX ADMIN — HYDROMORPHONE HYDROCHLORIDE 1 MILLIGRAM(S): 2 INJECTION INTRAMUSCULAR; INTRAVENOUS; SUBCUTANEOUS at 16:25

## 2022-05-25 RX ADMIN — Medication 15 MILLIGRAM(S): at 18:43

## 2022-05-25 RX ADMIN — OXYCODONE HYDROCHLORIDE 5 MILLIGRAM(S): 5 TABLET ORAL at 21:30

## 2022-05-25 RX ADMIN — SODIUM CHLORIDE 1000 MILLILITER(S): 9 INJECTION, SOLUTION INTRAVENOUS at 01:00

## 2022-05-25 RX ADMIN — Medication 400 MILLIGRAM(S): at 17:34

## 2022-05-25 RX ADMIN — HYDROMORPHONE HYDROCHLORIDE 1 MILLIGRAM(S): 2 INJECTION INTRAMUSCULAR; INTRAVENOUS; SUBCUTANEOUS at 11:00

## 2022-05-25 RX ADMIN — HEPARIN SODIUM 5000 UNIT(S): 5000 INJECTION INTRAVENOUS; SUBCUTANEOUS at 22:40

## 2022-05-25 RX ADMIN — Medication 100 MILLIEQUIVALENT(S): at 01:20

## 2022-05-25 RX ADMIN — Medication 100 MILLIEQUIVALENT(S): at 00:11

## 2022-05-25 RX ADMIN — Medication 15 MILLIGRAM(S): at 09:13

## 2022-05-25 RX ADMIN — Medication 1000 MILLIGRAM(S): at 00:05

## 2022-05-25 RX ADMIN — PIPERACILLIN AND TAZOBACTAM 25 GRAM(S): 4; .5 INJECTION, POWDER, LYOPHILIZED, FOR SOLUTION INTRAVENOUS at 22:40

## 2022-05-25 RX ADMIN — OXYCODONE HYDROCHLORIDE 5 MILLIGRAM(S): 5 TABLET ORAL at 20:53

## 2022-05-25 RX ADMIN — Medication 1000 MILLIGRAM(S): at 13:20

## 2022-05-25 RX ADMIN — CHLORHEXIDINE GLUCONATE 15 MILLILITER(S): 213 SOLUTION TOPICAL at 05:35

## 2022-05-25 RX ADMIN — FENTANYL CITRATE 4.93 MICROGRAM(S)/KG/HR: 50 INJECTION INTRAVENOUS at 07:17

## 2022-05-25 RX ADMIN — PIPERACILLIN AND TAZOBACTAM 25 GRAM(S): 4; .5 INJECTION, POWDER, LYOPHILIZED, FOR SOLUTION INTRAVENOUS at 13:06

## 2022-05-25 RX ADMIN — HYDROMORPHONE HYDROCHLORIDE 1 MILLIGRAM(S): 2 INJECTION INTRAMUSCULAR; INTRAVENOUS; SUBCUTANEOUS at 16:50

## 2022-05-25 RX ADMIN — SODIUM CHLORIDE 125 MILLILITER(S): 9 INJECTION, SOLUTION INTRAVENOUS at 07:17

## 2022-05-25 RX ADMIN — HYDROMORPHONE HYDROCHLORIDE 1 MILLIGRAM(S): 2 INJECTION INTRAMUSCULAR; INTRAVENOUS; SUBCUTANEOUS at 10:45

## 2022-05-25 RX ADMIN — Medication 15 MILLIGRAM(S): at 09:45

## 2022-05-25 NOTE — PROGRESS NOTE ADULT - ASSESSMENT
ASSESSMENT:  Patient is a 22 year old female with no PMHx who presented for second opinion after signing out AMA from [facility] where she presented on 5/22/22 with abdominal pain and fevers (admitted for treatment of TOA).  CTA Abdomen / Pelvis performed showing no contrast extravasation to suggest active bleeding.  Bilateral adnexal cysts, likely hemorrhagic on the right.  Tubular lesion in the right adnexa measuring 11.0 x 5.7cm with incomplete septations consistent with dilated fallopian tube, in keeping with the clinical history of tubo-ovarian abscess.  Small amount of low attenuating free fluid in the pelvis tracking up to the upper abdomen.  Findings likely represent serosanguineous fluid versus debris from pelvic inflammatory disease.  Patient was taken to the OR for a diagnostic laparoscopy converted to exploratory laparotomy, right salpingo-oophorectomy and open left ovarian cystectomy on 5/24/22.  Patient transferred to SICU post operatively for close hemodynamic monitoring.      PLAN:    NEUROLOGY:  - Sedated  - Wean off Precedex gtt and Fentanyl gtt as tolerated  - IV Tylenol and IV Dilaudid PRN for pain control    RESPIRATORY:  - Intubated  - CPAP trials as tolerated  - Monitor daily ABG and chest x-ray  - Continuous pulse oximetry  - Maintain O2 saturation >92%    CARDIOVASCULAR:  - Initially hypotensive requiring pressors (likely secondary to sedation)  - Weaned off Levophed gtt  - Keep MAP >65    GI / NUTRITION:  - NPO  - OGT to continuous low wall suction    RENAL / GENITOURINARY:  - LR @125cc/hr  - Indwelling chowdhury catheter  - Monitor electrolytes and replete PRN  - Continue to monitor strict ins and outs q1 hour    HEMATOLOGIC:  - Received 2 units PRBC and 2 units of cryo intra-operatively  - Monitor daily CBC and transfuse PRN  - VTE prophylaxis with Heparin subcutaneous    INFECTIOUS DISEASE:  - Currently afebrile with leukocytosis of 19.54  - Continue with IV Zosyn    ENDOCRINOLOGY:  - No active issues  - Continue to monitor glucose on BMP (goal 140-180)      Disposition: SICU    -------------------------------------------------------------------------------------- ASSESSMENT:  Patient is a 22 year old female with no PMHx who presented for second opinion after signing out AMA from [facility] where she presented on 5/22/22 with abdominal pain and fevers (admitted for treatment of TOA).  CTA Abdomen / Pelvis performed showing no contrast extravasation to suggest active bleeding.  Bilateral adnexal cysts, likely hemorrhagic on the right.  Tubular lesion in the right adnexa measuring 11.0 x 5.7cm with incomplete septations consistent with dilated fallopian tube, in keeping with the clinical history of tubo-ovarian abscess.  Small amount of low attenuating free fluid in the pelvis tracking up to the upper abdomen.  Findings likely represent serosanguineous fluid versus debris from pelvic inflammatory disease.  Patient was taken to the OR for a diagnostic laparoscopy converted to exploratory laparotomy, right salpingo-oophorectomy and open left ovarian cystectomy on 5/24/22.  Patient transferred to SICU post operatively for close hemodynamic monitoring.      PLAN:  NEUROLOGY:  - Sedated  - off Precedex gtt and d/c Fentanyl gtt  - IV Tylenol, Toradol and IV Dilaudid PRN for pain control    RESPIRATORY:  - Intubated, /22/5/40%  - c/w PSV trial as tolerated, once Fentanyl off  - Monitor daily ABG and chest x-ray  - Continuous pulse oximetry  - Maintain O2 saturation >92%    CARDIOVASCULAR:  - Initially hypotensive requiring pressors (likely secondary to sedation)  - off vasopressors  - Keep MAP >65    GI / NUTRITION:  - NPO  - OGT to continuous low wall suction  - midline incision and laproscopic port c/d/i    RENAL / GENITOURINARY:  - LR @125cc/hr  - Indwelling chowdhury catheter  - Monitor electrolytes and replete PRN  - Continue to monitor strict ins and outs q1 hour    HEMATOLOGIC:  - Received 2 units pRBC and 2 units of cryo intra-operatively  - Monitor daily CBC and transfuse PRN  - SQH for VTE prophylaxis    INFECTIOUS DISEASE:  - Currently afebrile with downtrending leukocytosis  - c/w IV Zosyn  - f/u Cx body fluid, G/C panel    ENDOCRINOLOGY:  - No active issues  - Continue to monitor glucose on BMP (goal 140-180)    T/L/D:  - pIV  - OGT  - A-line    Disposition: SICU    --------------------------------------------------------------------------------------

## 2022-05-25 NOTE — PROGRESS NOTE ADULT - SUBJECTIVE AND OBJECTIVE BOX
Gyn Progress Note POD#1 HD#2    Subjective:   Pt seen and examined at bedside. Able to respond with yes/no. I exaplained procedure and what was found at time of surgery. Patient understands. Points to ETT/OG, feels uncomfortable. Explained its importance. Denies CP, SOB, Abdominal pain. Overnight weaned off of Levophed/Propofol.       Objective:  T(F): 98.3 (22 @ 04:00), Max: 100.3 (22 @ 12:39)  HR: 85 (22 @ 06:00) (85 - 123)  BP: 96/63 (22 @ 06:00) (96/63 - 122/80)  RR: 22 (22 @ 06:00) (18 - 24)  SpO2: 99% (22 @ 06:00) (95% - 100%)  Wt(kg): --  I&O's Summary    24 May 2022 07:01  -  25 May 2022 06:38  --------------------------------------------------------  IN: 1682.7 mL / OUT: 790 mL / NET: 892.7 mL    MEDICATIONS  (STANDING):  acetaminophen   IVPB .. 1000 milliGRAM(s) IV Intermittent every 6 hours  chlorhexidine 0.12% Liquid 15 milliLiter(s) Oral Mucosa every 12 hours  chlorhexidine 2% Cloths 1 Application(s) Topical <User Schedule>  fentaNYL   Infusion. 0.5 MICROgram(s)/kG/Hr (4.93 mL/Hr) IV Continuous <Continuous>  heparin   Injectable 5000 Unit(s) SubCutaneous every 8 hours  lactated ringers. 1000 milliLiter(s) (125 mL/Hr) IV Continuous <Continuous>  piperacillin/tazobactam IVPB.. 3.375 Gram(s) IV Intermittent every 8 hours    MEDICATIONS  (PRN):  HYDROmorphone  Injectable 0.5 milliGRAM(s) IV Push every 4 hours PRN Moderate Pain (4 - 6)  HYDROmorphone  Injectable 1 milliGRAM(s) IV Push every 4 hours PRN Severe Pain (7 - 10)      Physical Exam:  Gen: Comfortable, NAD  Psych: appropriate mood & affect  CV: RRR, nl S1/S2, no M/R/G  Pulm: mechanical breath sounds  Abd: distended and tympanic, non tender, no rebound, no guarding; OG in place  Incision: midline vertical dressing clean, dry, intact with exception of small area of old blood; lsc port site x1 c/d/i  : minimal spotting on pad; chowdhury draining dark brown urine   Ext: Warm & well perfused. No edema or tenderness bilaterally      LABS:            8.6      18.52 )------------( 475        ( 22 @ 00:45 )            27.7            9.5      19.54 )------------( 627        ( 22 @ 22:00 )            30.9            7.4      16.43 )------------( 647        ( 22 @ 14:00 )            25.7        134<L>  |  104    |  5<L>   ----------------------------<  210<H>  3.9     |  19<L>  |  0.56       135    |  105    |  6<L>   ----------------------------<  196<H>  3.4<L>   |  18<L>  |  0.55       137    |  107    |  9      ----------------------------<  88     4.0     |  20<L>  |  0.60     Ca    8.5        25 May 2022 00:45  Ca    8.5        24 May 2022 22:00  Ca    8.3<L>      24 May 2022 14:00  Phos  2.9         Phos  3.2         Mg     1.70        Mg     1.60          TPro  5.9<L>  /  Alb  2.8<L>  /  TBili  0.8    /  DBili  x      /  AST  9      /  ALT  10     /  AlkPhos  51       TPro  6.3    /  Alb  3.2<L>  /  TBili  1.3<H>  /  DBili  x      /  AST  13     /  ALT  9      /  AlkPhos  56     24  TPro  6.7    /  Alb  3.0<L>  /  TBili  0.3    /  DBili  x      /  AST  11     /  ALT  10     /  AlkPhos  64     05-24    PT/INR - ( 25 May 2022 00:45 )   PT: 16.3 sec;   INR: 1.40 ratio    PTT - ( 25 May 2022 00:45 )  PTT:26.9 sec    Urinalysis Basic - ( 24 May 2022 14:25 )  Color: Yellow / Appearance: Clear / S.042 / pH: x  Gluc: x / Ketone: Moderate  / Bili: Negative / Urobili: <2 mg/dL   Blood: x / Protein: 30 mg/dL / Nitrite: Negative   Leuk Esterase: Negative / RBC: 9 /HPF / WBC 3 /HPF   Sq Epi: x / Non Sq Epi: 3 /HPF / Bacteria: Negative

## 2022-05-25 NOTE — PATIENT PROFILE ADULT - FALL HARM RISK - HARM RISK INTERVENTIONS

## 2022-05-25 NOTE — PROGRESS NOTE ADULT - ASSESSMENT
21 yo POD#1 s/p dx laparoscopy converted to ex Lap Right Salpingo-oophorectomy , Left cystectomy, partial omentectomy, evacuation of 1L of pus in setting of ruptured endometrioma with superimposed infection.    Neuro: Pain well controlled on IV Tylenol and fentanyl.   - s/p precedex gtt, Levophed/Propofol.   CV: Hemodynamically stable.   - s/p 2UpRBC +500cc albumin + 2U Cryo intra-op.   - No evidence of ongoing bleeding given lack of tenderness on exam,    -  VSS with adequate UOP.   - Non-anion gap acidosis (mixed metabolic / respiratory) resolving  Pulm: O2 sat WNL on VCAC (FiO2 40%), extubation per SICU.  GI: NPO with OG   : chowdhury in situ  Heme: DVT ppx: HQS, SCDs while in bed  ID: Afebrile. Leukocytosis improving.   - Zosyn (5/24- ) for intraabdominal infection.   - AM CBC with diff.   - f/u Abdominal Fluid Cx (5/24)  - GC/CT Neg from [facility]  FEN: LR@125, Replete electrolytes PRN   Dispo: Continued inpatient care, appreciate excellent SICU care     Orlin Gutierrez PGY3

## 2022-05-25 NOTE — CHART NOTE - NSCHARTNOTEFT_GEN_A_CORE
Patient seen and examined at bedside, recently post-op. Patient is easily arousable, though intubated with minimal sedation. Not in pain.  Patient with chowdhury in situ  Patient not yet ambulated.  Patient NPO with OG in place.    Denies CP, SOB, N/V, fevers, and chills. Does note throat pain.     Vital Signs Last 24 Hours  T(C): 36.7 (05-25-22 @ 00:00), Max: 37.9 (05-24-22 @ 12:39)  HR: 88 (05-25-22 @ 02:00) (88 - 123)  BP: 103/67 (05-25-22 @ 02:00) (102/58 - 122/80)  RR: 22 (05-25-22 @ 02:00) (18 - 24)  SpO2: 97% (05-25-22 @ 02:00) (95% - 100%)    I&O's Summary    24 May 2022 07:01  -  25 May 2022 02:38  --------------------------------------------------------  IN: 1120.9 mL / OUT: 400 mL / NET: 720.9 mL        Physical Exam:  Gen: Comfortable, NAD  Psych: appropriate mood & affect  CV: RRR, nl S1/S2, no M/R/G  Pulm: mechanical breath sounds  Abd: distended and tympanic, non tender, no rebound, no guarding; OG in place  Incision: midline vertical dressing clean, dry, intact with exception of small area of old blood; lsc port site x1 c/d/i  : minimal spotting on pad; chowdhury draining dark brown urine   Ext: Warm & well perfused. No edema or tenderness bilaterally    Labs:             8.6<L>  18.52<H> )-----------( 475<H>    ( 05-25 @ 00:45 )             27.7<L>               9.5<L>  19.54<H> )-----------( 627<H>    ( 05-24 @ 22:00 )             30.9<L>               7.4<L>  16.43<H> )-----------( 647<H>    ( 05-24 @ 14:00 )             25.7<L>        MEDICATIONS  (STANDING):  acetaminophen   IVPB .. 1000 milliGRAM(s) IV Intermittent every 6 hours  chlorhexidine 0.12% Liquid 15 milliLiter(s) Oral Mucosa every 12 hours  chlorhexidine 2% Cloths 1 Application(s) Topical <User Schedule>  dexMEDEtomidine Infusion 0.2 MICROgram(s)/kG/Hr (4.93 mL/Hr) IV Continuous <Continuous>  fentaNYL   Infusion. 0.5 MICROgram(s)/kG/Hr (4.93 mL/Hr) IV Continuous <Continuous>  heparin   Injectable 5000 Unit(s) SubCutaneous every 8 hours  lactated ringers. 1000 milliLiter(s) (125 mL/Hr) IV Continuous <Continuous>  magnesium sulfate  IVPB 2 Gram(s) IV Intermittent once  piperacillin/tazobactam IVPB.. 3.375 Gram(s) IV Intermittent every 8 hours    MEDICATIONS  (PRN):  HYDROmorphone  Injectable 0.5 milliGRAM(s) IV Push every 4 hours PRN Moderate Pain (4 - 6)  HYDROmorphone  Injectable 1 milliGRAM(s) IV Push every 4 hours PRN Severe Pain (7 - 10)      A/P: Pt POD#1 s/p dx laparoscopy converted to ex Lap Right Salpingo-oophorectomy , Left cystectomy, partial omentectomy, evacuation of 1L of pus in setting of ruptured endometrioma with superimposed infection who is doing well in immediate postoperative period.     Neuro: Pain well controlled on IV tylenol, fentanyl and precedex gtt   CV: Hemodynamically stable, H/H stable   Pulm: O2 sat WNL on RA, Increase ambulation, encourage incentive spirometry use  GI: Tolerating regular diet  : Voiding spontaneously  Heme: DVT ppx: Lovenox, SCDs while in bed  ID: Afebrile, No signs of infection  FEN: LR@125, Replete electrolytes PRN   Dispo:     Veena Parkinson, PGY-2 Patient seen and examined at bedside, recently post-op. Patient is easily arousable, though intubated with minimal sedation. Not in pain.  Patient with chowdhury in situ  Patient not yet ambulated.  Patient NPO with OG in place.    Denies CP, SOB, N/V, fevers, and chills. Does note throat pain.     Vital Signs Last 24 Hours  T(C): 36.7 (05-25-22 @ 00:00), Max: 37.9 (05-24-22 @ 12:39)  HR: 88 (05-25-22 @ 02:00) (88 - 123)  BP: 103/67 (05-25-22 @ 02:00) (102/58 - 122/80)  RR: 22 (05-25-22 @ 02:00) (18 - 24)  SpO2: 97% (05-25-22 @ 02:00) (95% - 100%)    I&O's Summary    24 May 2022 07:01  -  25 May 2022 02:38  --------------------------------------------------------  IN: 1120.9 mL / OUT: 400 mL / NET: 720.9 mL        Physical Exam:  Gen: Comfortable, NAD  Psych: appropriate mood & affect  CV: RRR, nl S1/S2, no M/R/G  Pulm: mechanical breath sounds  Abd: distended and tympanic, non tender, no rebound, no guarding; OG in place  Incision: midline vertical dressing clean, dry, intact with exception of small area of old blood; lsc port site x1 c/d/i  : minimal spotting on pad; chowdhury draining dark brown urine   Ext: Warm & well perfused. No edema or tenderness bilaterally    Labs:             8.6<L>  18.52<H> )-----------( 475<H>    ( 05-25 @ 00:45 )             27.7<L>               9.5<L>  19.54<H> )-----------( 627<H>    ( 05-24 @ 22:00 )             30.9<L>               7.4<L>  16.43<H> )-----------( 647<H>    ( 05-24 @ 14:00 )             25.7<L>        MEDICATIONS  (STANDING):  acetaminophen   IVPB .. 1000 milliGRAM(s) IV Intermittent every 6 hours  chlorhexidine 0.12% Liquid 15 milliLiter(s) Oral Mucosa every 12 hours  chlorhexidine 2% Cloths 1 Application(s) Topical <User Schedule>  dexMEDEtomidine Infusion 0.2 MICROgram(s)/kG/Hr (4.93 mL/Hr) IV Continuous <Continuous>  fentaNYL   Infusion. 0.5 MICROgram(s)/kG/Hr (4.93 mL/Hr) IV Continuous <Continuous>  heparin   Injectable 5000 Unit(s) SubCutaneous every 8 hours  lactated ringers. 1000 milliLiter(s) (125 mL/Hr) IV Continuous <Continuous>  magnesium sulfate  IVPB 2 Gram(s) IV Intermittent once  piperacillin/tazobactam IVPB.. 3.375 Gram(s) IV Intermittent every 8 hours    MEDICATIONS  (PRN):  HYDROmorphone  Injectable 0.5 milliGRAM(s) IV Push every 4 hours PRN Moderate Pain (4 - 6)  HYDROmorphone  Injectable 1 milliGRAM(s) IV Push every 4 hours PRN Severe Pain (7 - 10)      A/P: Pt POD#1 s/p dx laparoscopy converted to ex Lap Right Salpingo-oophorectomy , Left cystectomy, partial omentectomy, evacuation of 1L of pus in setting of ruptured endometrioma with superimposed infection who is doing well in immediate postoperative period.     Neuro: Pain well controlled on IV tylenol and fentanyl. Pt on precedex gtt given attempt to pull ETT  CV: Hemodynamically stable. S/p Levophed. s/p 2UpRBC +500cc ablbumin + 2U Cryo intra-op with appropriate increase in H/H postop though recent labs with downtrending h/h. No evidence of ongoing bleeding given lack of tenderness on exam, VSS with adequate UOP.   Pulm: O2 sat WNL on VCAC (FiO2 40%), extubation per SICU  GI: NPO with OG   : chowdhury in situ  Heme: DVT ppx: HQS, SCDs while in bed  ID: Afebrile. Leukocytosis improving. Continue Zosyn (5/24- ) for intraabdominal infection. AM CBC with diff.   FEN: LR@125, Replete electrolytes PRN   Dispo: Continued inpatient care, appreciate excellent SICU care     Veena Parkinson, PGY-2 Patient seen and examined at bedside, recently post-op. Patient is easily arousable, though intubated with minimal sedation. Not in pain.  Patient with chowdhury in situ  Patient not yet ambulated.  Patient NPO with OG in place.    Denies CP, SOB, N/V, fevers, and chills. Does note throat pain.     Vital Signs Last 24 Hours  T(C): 36.7 (05-25-22 @ 00:00), Max: 37.9 (05-24-22 @ 12:39)  HR: 88 (05-25-22 @ 02:00) (88 - 123)  BP: 103/67 (05-25-22 @ 02:00) (102/58 - 122/80)  RR: 22 (05-25-22 @ 02:00) (18 - 24)  SpO2: 97% (05-25-22 @ 02:00) (95% - 100%)    I&O's Summary    24 May 2022 07:01  -  25 May 2022 02:38  --------------------------------------------------------  IN: 1120.9 mL / OUT: 400 mL / NET: 720.9 mL        Physical Exam:  Gen: Comfortable, NAD  Psych: appropriate mood & affect  CV: RRR, nl S1/S2, no M/R/G  Pulm: mechanical breath sounds  Abd: distended and tympanic, non tender, no rebound, no guarding; OG in place  Incision: midline vertical dressing clean, dry, intact with exception of small area of old blood; lsc port site x1 c/d/i  : minimal spotting on pad; chowdhury draining dark brown urine   Ext: Warm & well perfused. No edema or tenderness bilaterally    Labs:             8.6<L>  18.52<H> )-----------( 475<H>    ( 05-25 @ 00:45 )             27.7<L>               9.5<L>  19.54<H> )-----------( 627<H>    ( 05-24 @ 22:00 )             30.9<L>               7.4<L>  16.43<H> )-----------( 647<H>    ( 05-24 @ 14:00 )             25.7<L>        MEDICATIONS  (STANDING):  acetaminophen   IVPB .. 1000 milliGRAM(s) IV Intermittent every 6 hours  chlorhexidine 0.12% Liquid 15 milliLiter(s) Oral Mucosa every 12 hours  chlorhexidine 2% Cloths 1 Application(s) Topical <User Schedule>  dexMEDEtomidine Infusion 0.2 MICROgram(s)/kG/Hr (4.93 mL/Hr) IV Continuous <Continuous>  fentaNYL   Infusion. 0.5 MICROgram(s)/kG/Hr (4.93 mL/Hr) IV Continuous <Continuous>  heparin   Injectable 5000 Unit(s) SubCutaneous every 8 hours  lactated ringers. 1000 milliLiter(s) (125 mL/Hr) IV Continuous <Continuous>  magnesium sulfate  IVPB 2 Gram(s) IV Intermittent once  piperacillin/tazobactam IVPB.. 3.375 Gram(s) IV Intermittent every 8 hours    MEDICATIONS  (PRN):  HYDROmorphone  Injectable 0.5 milliGRAM(s) IV Push every 4 hours PRN Moderate Pain (4 - 6)  HYDROmorphone  Injectable 1 milliGRAM(s) IV Push every 4 hours PRN Severe Pain (7 - 10)      A/P: Pt POD#1 s/p dx laparoscopy converted to ex Lap Right Salpingo-oophorectomy , Left cystectomy, partial omentectomy, evacuation of 1L of pus in setting of ruptured endometrioma with superimposed infection who is doing well in immediate postoperative period.     Neuro: Pain well controlled on IV tylenol and fentanyl. Pt on precedex gtt given attempt to pull ETT  CV: Hemodynamically stable. S/p Levophed. s/p 2UpRBC +500cc ablbumin + 2U Cryo intra-op with appropriate increase in H/H postop though recent labs with downtrending h/h. No evidence of ongoing bleeding given lack of tenderness on exam, VSS with adequate UOP. Pt with non-anion gap acidosis (mixed metabolic / respiratory) which is improving with elevated lactate, now improving. Trend blood gas.   Pulm: O2 sat WNL on VCAC (FiO2 40%), extubation per SICU.  GI: NPO with OG   : chowdhury in situ  Heme: DVT ppx: HQS, SCDs while in bed  ID: Afebrile. Leukocytosis improving. Continue Zosyn (5/24- ) for intraabdominal infection. AM CBC with diff.   FEN: LR@125, Replete electrolytes PRN   Dispo: Continued inpatient care, appreciate excellent SICU care     Veena Parkinson, PGY-2

## 2022-05-25 NOTE — PROGRESS NOTE ADULT - SUBJECTIVE AND OBJECTIVE BOX
SICU Daily Progress Note  =====================================================  Interval / Overnight Events: Weaned off Levophed gtt.  Propofol gtt weaned off and Fentanyl gtt started, however patient remained agitated and attempted to pull ETT.  Precedex gtt started.      HPI:  Patient is a 22 year old female with no PMHx who presented for second opinion after signing out AMA from [facility] where she presented on 5/22/22 with abdominal pain and fevers (admitted for treatment of TOA). (24 May 2022 14:29)      PAST MEDICAL & SURGICAL HISTORY:  Abnormal uterine bleeding      ALLERGIES:  No Known Allergies    --------------------------------------------------------------------------------------    MEDICATIONS:    Neurologic Medications  acetaminophen   IVPB .. 1000 milliGRAM(s) IV Intermittent every 6 hours  dexMEDEtomidine Infusion 0.2 MICROgram(s)/kG/Hr IV Continuous <Continuous>  fentaNYL   Infusion. 0.5 MICROgram(s)/kG/Hr IV Continuous <Continuous>  HYDROmorphone  Injectable 0.5 milliGRAM(s) IV Push every 4 hours PRN Moderate Pain (4 - 6)  HYDROmorphone  Injectable 1 milliGRAM(s) IV Push every 4 hours PRN Severe Pain (7 - 10)    Gastrointestinal Medications  lactated ringers. 1000 milliLiter(s) IV Continuous <Continuous>  magnesium sulfate  IVPB 2 Gram(s) IV Intermittent once  potassium chloride  10 mEq/100 mL IVPB 10 milliEquivalent(s) IV Intermittent every 1 hour    Hematologic/Oncologic Medications  heparin   Injectable 5000 Unit(s) SubCutaneous every 8 hours    Antimicrobial/Immunologic Medications  piperacillin/tazobactam IVPB.. 3.375 Gram(s) IV Intermittent every 8 hours    Topical/Other Medications  chlorhexidine 0.12% Liquid 15 milliLiter(s) Oral Mucosa every 12 hours  chlorhexidine 2% Cloths 1 Application(s) Topical <User Schedule>    --------------------------------------------------------------------------------------    VITAL SIGNS:  T(C): 35.2 (24 May 2022 22:00), Max: 37.9 (24 May 2022 12:39)  T(F): 95.3 (24 May 2022 22:00), Max: 100.3 (24 May 2022 12:39)  HR: 101 (25 May 2022 00:00) (101 - 123)  BP: 102/58 (25 May 2022 00:00) (102/58 - 122/80)  BP(mean): 70 (25 May 2022 00:00) (70 - 73)  ABP: 111/66 (25 May 2022 00:00) (111/66 - 118/68)  ABP(mean): 67 (25 May 2022 00:00) (67 - 77)  RR: 18 (25 May 2022 00:00) (18 - 24)  SpO2: 95% (25 May 2022 00:00) (95% - 100%)    --------------------------------------------------------------------------------------    INS AND OUTS:    24 May 2022 07:01  -  25 May 2022 00:14  --------------------------------------------------------  IN:    Dexmedetomidine: 7.4 mL    FentaNYL: 59.2 mL    Lactated Ringers: 250 mL  Total IN: 316.6 mL    OUT:    Voided (mL): 300 mL  Total OUT: 300 mL    Total NET: 16.6 mL    --------------------------------------------------------------------------------------    EXAM    NEUROLOGY  Exam: Sedated.    HEENT  Exam: Normocephalic, atraumatic.    RESPIRATORY  Exam: Intubated.    CARDIOVASCULAR  Exam: S1, S2.  Regular rate and rhythm.    GI/NUTRITION  Exam: Abdomen softly distended.  Non-tender to palpation.  Midline incision clean, dry and intact.  OGT.  Current Diet: NPO    MUSCULOSKELETAL  Exam: All extremities moving spontaneously without limitations.      METABOLIC / FLUIDS / ELECTROLYTES  lactated ringers. 1000 milliLiter(s) IV Continuous <Continuous>  magnesium sulfate  IVPB 2 Gram(s) IV Intermittent once  potassium chloride  10 mEq/100 mL IVPB 10 milliEquivalent(s) IV Intermittent every 1 hour      HEMATOLOGIC  [x] VTE Prophylaxis: heparin   Injectable 5000 Unit(s) SubCutaneous every 8 hours      INFECTIOUS DISEASE  Antimicrobials/Immunologic Medications:  piperacillin/tazobactam IVPB.. 3.375 Gram(s) IV Intermittent every 8 hours      TUBES / LINES / DRAINS  [x] Peripheral IV  [] Central Venous Line     	[] R	[] L	[] IJ	[] Fem	[] SC	Date Placed:   [x] Arterial Line		[] R	[x] L	[] Fem	[x] Rad	[] Ax	Date Placed:   [] PICC		[] Midline		[] Mediport  [x] Urinary Catheter		Date Placed:   [x] Necessity of urinary, arterial, and venous catheters discussed    -------------------------------------------------------------------------------------- SICU Daily Progress Note  =====================================================  Interval / Overnight Events: Weaned off Levophed gtt.  Propofol gtt weaned off and Fentanyl gtt started, however patient remained agitated and attempted to pull ETT.  Precedex gtt started.    HPI:  Patient is a 22 year old female with no PMHx who presented for second opinion after signing out AMA from [facility] where she presented on 5/22/22 with abdominal pain and fevers (admitted for treatment of TOA). (24 May 2022 14:29)    PAST MEDICAL & SURGICAL HISTORY:  Abnormal uterine bleeding    ALLERGIES:  No Known Allergies    --------------------------------------------------------------------------------------    MEDICATIONS:    Neurologic Medications  acetaminophen   IVPB .. 1000 milliGRAM(s) IV Intermittent every 6 hours  dexMEDEtomidine Infusion 0.2 MICROgram(s)/kG/Hr IV Continuous <Continuous>  fentaNYL   Infusion. 0.5 MICROgram(s)/kG/Hr IV Continuous <Continuous>  HYDROmorphone  Injectable 0.5 milliGRAM(s) IV Push every 4 hours PRN Moderate Pain (4 - 6)  HYDROmorphone  Injectable 1 milliGRAM(s) IV Push every 4 hours PRN Severe Pain (7 - 10)    Gastrointestinal Medications  lactated ringers. 1000 milliLiter(s) IV Continuous <Continuous>  magnesium sulfate  IVPB 2 Gram(s) IV Intermittent once  potassium chloride  10 mEq/100 mL IVPB 10 milliEquivalent(s) IV Intermittent every 1 hour    Hematologic/Oncologic Medications  heparin   Injectable 5000 Unit(s) SubCutaneous every 8 hours    Antimicrobial/Immunologic Medications  piperacillin/tazobactam IVPB.. 3.375 Gram(s) IV Intermittent every 8 hours    Topical/Other Medications  chlorhexidine 0.12% Liquid 15 milliLiter(s) Oral Mucosa every 12 hours  chlorhexidine 2% Cloths 1 Application(s) Topical <User Schedule>    --------------------------------------------------------------------------------------    VITAL SIGNS:  T(C): 35.2 (24 May 2022 22:00), Max: 37.9 (24 May 2022 12:39)  T(F): 95.3 (24 May 2022 22:00), Max: 100.3 (24 May 2022 12:39)  HR: 101 (25 May 2022 00:00) (101 - 123)  BP: 102/58 (25 May 2022 00:00) (102/58 - 122/80)  BP(mean): 70 (25 May 2022 00:00) (70 - 73)  ABP: 111/66 (25 May 2022 00:00) (111/66 - 118/68)  ABP(mean): 67 (25 May 2022 00:00) (67 - 77)  RR: 18 (25 May 2022 00:00) (18 - 24)  SpO2: 95% (25 May 2022 00:00) (95% - 100%)    --------------------------------------------------------------------------------------    INS AND OUTS:    24 May 2022 07:01  -  25 May 2022 00:14  --------------------------------------------------------  IN:    Dexmedetomidine: 7.4 mL    FentaNYL: 59.2 mL    Lactated Ringers: 250 mL  Total IN: 316.6 mL    OUT:    Voided (mL): 300 mL  Total OUT: 300 mL    Total NET: 16.6 mL    --------------------------------------------------------------------------------------    EXAM    NEUROLOGY  Exam: Sedated.    HEENT  Exam: Normocephalic, atraumatic.    RESPIRATORY  Exam: Intubated.    CARDIOVASCULAR  Exam: S1, S2.  Regular rate and rhythm.    GI/NUTRITION  Exam: Abdomen softly distended.  Non-tender to palpation.  Midline incision clean, dry and intact.  OGT.  Current Diet: NPO    MUSCULOSKELETAL  Exam: All extremities moving spontaneously without limitations.      METABOLIC / FLUIDS / ELECTROLYTES  lactated ringers. 1000 milliLiter(s) IV Continuous <Continuous>  magnesium sulfate  IVPB 2 Gram(s) IV Intermittent once  potassium chloride  10 mEq/100 mL IVPB 10 milliEquivalent(s) IV Intermittent every 1 hour      HEMATOLOGIC  [x] VTE Prophylaxis: heparin   Injectable 5000 Unit(s) SubCutaneous every 8 hours      INFECTIOUS DISEASE  Antimicrobials/Immunologic Medications:  piperacillin/tazobactam IVPB.. 3.375 Gram(s) IV Intermittent every 8 hours      TUBES / LINES / DRAINS  [x] Peripheral IV  [] Central Venous Line     	[] R	[] L	[] IJ	[] Fem	[] SC	Date Placed:   [x] Arterial Line		[] R	[x] L	[] Fem	[x] Rad	[] Ax	Date Placed:   [] PICC		[] Midline		[] Mediport  [x] Urinary Catheter		Date Placed:   [x] Necessity of urinary, arterial, and venous catheters discussed    --------------------------------------------------------------------------------------

## 2022-05-25 NOTE — PROGRESS NOTE ADULT - ASSESSMENT
POST ANESTHESIA EVALUATION    Postop Day 1        MENTAL STATUS:   [ x ] Awake   [  ] Arousable   [  ] Sedated   [  ] Unable to assess   [  ] Other:    AIRWAY PATENCY:   [ x ] Satisfactory   [  ] Intubated   [  ] Other: Extubated in ICU.     NAUSEA / VOMITING:  [ x ] None   [  ] Controlled with current regimen   [  ] Unable to assess   [  ] Other:    PAIN:   [  ] None   [ x ] Controlled with current regimen   [  ] Unable to assess   [  ] Other:    [ x ] No apparent anesthesia complications    Mode:       Vital Signs Last 24 Hrs  T(C): 36.9 (25 May 2022 08:00), Max: 37.9 (24 May 2022 12:39)  T(F): 98.4 (25 May 2022 08:00), Max: 100.3 (24 May 2022 12:39)  HR: 76 (25 May 2022 10:00) (73 - 123)  BP: 115/85 (25 May 2022 09:00) (96/63 - 122/80)  BP(mean): 96 (25 May 2022 09:00) (67 - 96)  RR: 23 (25 May 2022 10:00) (18 - 24)  SpO2: 98% (25 May 2022 10:00) (95% - 100%)      I&O's Summary    24 May 2022 07:01  -  25 May 2022 07:00  --------------------------------------------------------  IN: 1817.6 mL / OUT: 840 mL / NET: 977.6 mL    25 May 2022 07:01  -  25 May 2022 10:31  --------------------------------------------------------  IN: 375 mL / OUT: 340 mL / NET: 35 mL            Comments:      Rashaad Adams M.D.  Department of Anesthesiology  [facility]

## 2022-05-26 ENCOUNTER — TRANSCRIPTION ENCOUNTER (OUTPATIENT)
Age: 22
End: 2022-05-26

## 2022-05-26 LAB
ANION GAP SERPL CALC-SCNC: 11 MMOL/L — SIGNIFICANT CHANGE UP (ref 7–14)
BUN SERPL-MCNC: 6 MG/DL — LOW (ref 7–23)
C TRACH RRNA SPEC QL NAA+PROBE: SIGNIFICANT CHANGE UP
CALCIUM SERPL-MCNC: 8.1 MG/DL — LOW (ref 8.4–10.5)
CHLORIDE SERPL-SCNC: 103 MMOL/L — SIGNIFICANT CHANGE UP (ref 98–107)
CO2 SERPL-SCNC: 22 MMOL/L — SIGNIFICANT CHANGE UP (ref 22–31)
CREAT SERPL-MCNC: 0.57 MG/DL — SIGNIFICANT CHANGE UP (ref 0.5–1.3)
EGFR: 131 ML/MIN/1.73M2 — SIGNIFICANT CHANGE UP
GLUCOSE SERPL-MCNC: 92 MG/DL — SIGNIFICANT CHANGE UP (ref 70–99)
HCT VFR BLD CALC: 28.3 % — LOW (ref 34.5–45)
HGB BLD-MCNC: 8.5 G/DL — LOW (ref 11.5–15.5)
MAGNESIUM SERPL-MCNC: 2 MG/DL — SIGNIFICANT CHANGE UP (ref 1.6–2.6)
MCHC RBC-ENTMCNC: 21.9 PG — LOW (ref 27–34)
MCHC RBC-ENTMCNC: 30 GM/DL — LOW (ref 32–36)
MCV RBC AUTO: 72.8 FL — LOW (ref 80–100)
N GONORRHOEA RRNA SPEC QL NAA+PROBE: SIGNIFICANT CHANGE UP
NRBC # BLD: 0 /100 WBCS — SIGNIFICANT CHANGE UP
NRBC # FLD: 0 K/UL — SIGNIFICANT CHANGE UP
PHOSPHATE SERPL-MCNC: 2.8 MG/DL — SIGNIFICANT CHANGE UP (ref 2.5–4.5)
PLATELET # BLD AUTO: 572 K/UL — HIGH (ref 150–400)
POTASSIUM SERPL-MCNC: 3.4 MMOL/L — LOW (ref 3.5–5.3)
POTASSIUM SERPL-SCNC: 3.4 MMOL/L — LOW (ref 3.5–5.3)
RBC # BLD: 3.89 M/UL — SIGNIFICANT CHANGE UP (ref 3.8–5.2)
RBC # FLD: 21.7 % — HIGH (ref 10.3–14.5)
SODIUM SERPL-SCNC: 136 MMOL/L — SIGNIFICANT CHANGE UP (ref 135–145)
WBC # BLD: 16.22 K/UL — HIGH (ref 3.8–10.5)
WBC # FLD AUTO: 16.22 K/UL — HIGH (ref 3.8–10.5)

## 2022-05-26 PROCEDURE — 99232 SBSQ HOSP IP/OBS MODERATE 35: CPT

## 2022-05-26 RX ORDER — SODIUM,POTASSIUM PHOSPHATES 278-250MG
1 POWDER IN PACKET (EA) ORAL ONCE
Refills: 0 | Status: COMPLETED | OUTPATIENT
Start: 2022-05-26 | End: 2022-05-26

## 2022-05-26 RX ORDER — OXYCODONE HYDROCHLORIDE 5 MG/1
5 TABLET ORAL EVERY 4 HOURS
Refills: 0 | Status: DISCONTINUED | OUTPATIENT
Start: 2022-05-26 | End: 2022-05-26

## 2022-05-26 RX ORDER — OXYCODONE HYDROCHLORIDE 5 MG/1
5 TABLET ORAL EVERY 4 HOURS
Refills: 0 | Status: DISCONTINUED | OUTPATIENT
Start: 2022-05-26 | End: 2022-05-30

## 2022-05-26 RX ORDER — SIMETHICONE 80 MG/1
80 TABLET, CHEWABLE ORAL
Refills: 0 | Status: DISCONTINUED | OUTPATIENT
Start: 2022-05-26 | End: 2022-05-30

## 2022-05-26 RX ORDER — POTASSIUM CHLORIDE 20 MEQ
10 PACKET (EA) ORAL
Refills: 0 | Status: COMPLETED | OUTPATIENT
Start: 2022-05-26 | End: 2022-05-26

## 2022-05-26 RX ORDER — OXYCODONE HYDROCHLORIDE 5 MG/1
10 TABLET ORAL EVERY 6 HOURS
Refills: 0 | Status: DISCONTINUED | OUTPATIENT
Start: 2022-05-26 | End: 2022-05-26

## 2022-05-26 RX ORDER — SENNA PLUS 8.6 MG/1
2 TABLET ORAL AT BEDTIME
Refills: 0 | Status: DISCONTINUED | OUTPATIENT
Start: 2022-05-26 | End: 2022-05-27

## 2022-05-26 RX ORDER — SIMETHICONE 80 MG/1
80 TABLET, CHEWABLE ORAL DAILY
Refills: 0 | Status: DISCONTINUED | OUTPATIENT
Start: 2022-05-26 | End: 2022-05-26

## 2022-05-26 RX ORDER — IBUPROFEN 200 MG
600 TABLET ORAL EVERY 6 HOURS
Refills: 0 | Status: DISCONTINUED | OUTPATIENT
Start: 2022-05-26 | End: 2022-05-30

## 2022-05-26 RX ADMIN — CHLORHEXIDINE GLUCONATE 1 APPLICATION(S): 213 SOLUTION TOPICAL at 06:00

## 2022-05-26 RX ADMIN — OXYCODONE HYDROCHLORIDE 5 MILLIGRAM(S): 5 TABLET ORAL at 08:32

## 2022-05-26 RX ADMIN — Medication 975 MILLIGRAM(S): at 00:30

## 2022-05-26 RX ADMIN — OXYCODONE HYDROCHLORIDE 5 MILLIGRAM(S): 5 TABLET ORAL at 23:13

## 2022-05-26 RX ADMIN — HEPARIN SODIUM 5000 UNIT(S): 5000 INJECTION INTRAVENOUS; SUBCUTANEOUS at 14:38

## 2022-05-26 RX ADMIN — OXYCODONE HYDROCHLORIDE 5 MILLIGRAM(S): 5 TABLET ORAL at 22:43

## 2022-05-26 RX ADMIN — SIMETHICONE 80 MILLIGRAM(S): 80 TABLET, CHEWABLE ORAL at 17:25

## 2022-05-26 RX ADMIN — Medication 600 MILLIGRAM(S): at 23:36

## 2022-05-26 RX ADMIN — OXYCODONE HYDROCHLORIDE 5 MILLIGRAM(S): 5 TABLET ORAL at 08:02

## 2022-05-26 RX ADMIN — Medication 975 MILLIGRAM(S): at 11:57

## 2022-05-26 RX ADMIN — SODIUM CHLORIDE 60 MILLILITER(S): 9 INJECTION, SOLUTION INTRAVENOUS at 22:43

## 2022-05-26 RX ADMIN — Medication 975 MILLIGRAM(S): at 12:22

## 2022-05-26 RX ADMIN — Medication 100 MILLIEQUIVALENT(S): at 06:03

## 2022-05-26 RX ADMIN — Medication 100 MILLIEQUIVALENT(S): at 10:56

## 2022-05-26 RX ADMIN — Medication 975 MILLIGRAM(S): at 23:36

## 2022-05-26 RX ADMIN — HEPARIN SODIUM 5000 UNIT(S): 5000 INJECTION INTRAVENOUS; SUBCUTANEOUS at 21:20

## 2022-05-26 RX ADMIN — OXYCODONE HYDROCHLORIDE 10 MILLIGRAM(S): 5 TABLET ORAL at 11:57

## 2022-05-26 RX ADMIN — OXYCODONE HYDROCHLORIDE 5 MILLIGRAM(S): 5 TABLET ORAL at 16:37

## 2022-05-26 RX ADMIN — HEPARIN SODIUM 5000 UNIT(S): 5000 INJECTION INTRAVENOUS; SUBCUTANEOUS at 05:50

## 2022-05-26 RX ADMIN — Medication 975 MILLIGRAM(S): at 06:21

## 2022-05-26 RX ADMIN — Medication 975 MILLIGRAM(S): at 05:51

## 2022-05-26 RX ADMIN — PIPERACILLIN AND TAZOBACTAM 25 GRAM(S): 4; .5 INJECTION, POWDER, LYOPHILIZED, FOR SOLUTION INTRAVENOUS at 05:51

## 2022-05-26 RX ADMIN — Medication 15 MILLIGRAM(S): at 10:26

## 2022-05-26 RX ADMIN — Medication 975 MILLIGRAM(S): at 17:25

## 2022-05-26 RX ADMIN — Medication 600 MILLIGRAM(S): at 17:25

## 2022-05-26 RX ADMIN — OXYCODONE HYDROCHLORIDE 5 MILLIGRAM(S): 5 TABLET ORAL at 03:00

## 2022-05-26 RX ADMIN — Medication 975 MILLIGRAM(S): at 00:03

## 2022-05-26 RX ADMIN — OXYCODONE HYDROCHLORIDE 5 MILLIGRAM(S): 5 TABLET ORAL at 02:42

## 2022-05-26 RX ADMIN — Medication 1 PACKET(S): at 04:53

## 2022-05-26 RX ADMIN — Medication 15 MILLIGRAM(S): at 04:44

## 2022-05-26 RX ADMIN — Medication 15 MILLIGRAM(S): at 10:11

## 2022-05-26 RX ADMIN — Medication 100 MILLIEQUIVALENT(S): at 04:55

## 2022-05-26 RX ADMIN — OXYCODONE HYDROCHLORIDE 10 MILLIGRAM(S): 5 TABLET ORAL at 12:27

## 2022-05-26 RX ADMIN — Medication 15 MILLIGRAM(S): at 04:14

## 2022-05-26 RX ADMIN — SODIUM CHLORIDE 60 MILLILITER(S): 9 INJECTION, SOLUTION INTRAVENOUS at 14:37

## 2022-05-26 NOTE — PHYSICAL THERAPY INITIAL EVALUATION ADULT - MANUAL MUSCLE TESTING RESULTS, REHAB EVAL
Not formally assess due to severity of abdominal pain. Pt able to perform sit to stand with contact-guard assistance.

## 2022-05-26 NOTE — DIETITIAN INITIAL EVALUATION ADULT - OTHER INFO
23 y/o female with hemoperitoneum s/p R salpingo-oopherectomy, L cystectomy and evacuation of 1L abdominal purulence. Pt had been NPO until recently, now receiving regular diet. IVF of LR and IVPB fluids. Pt said she is hungry with good appetite. She denies food allergies, nausea/vomiting/diarrhea/constipation, or issues with chewing/swallowing. She does not have any significant weight change PTA - pt said she "has always been a little heavy." Reinforced weight reduction for maintaining good overall health as BMI 43.9 kg/m2. Pt without additional nutrition related issues or concerns at this time. RDN services to remain available as needed.

## 2022-05-26 NOTE — PROGRESS NOTE ADULT - SUBJECTIVE AND OBJECTIVE BOX
Gyn Progress Note POD#2  HD#3    Pt seen and examined at bedside. No acute events overnight. Pain well controlled on meds. Patient not yet OOB. Voiding spontaneously. Denies flatus. Denies nausea, vomiting, fever, chills, chest pain, SOB, lightheadedness, dizziness.      Objective:  T(F): 97.5 (22 @ 00:00), Max: 98.4 (22 @ 08:00)  HR: 98 (22 @ 04:00) (73 - 99)  BP: 121/81 (22 @ 04:00) (97/65 - 122/86)  RR: 20 (22 @ 04:00) (17 - 29)  SpO2: 96% (22 @ 04:00) (91% - 100%)  Wt(kg): --    MEDICATIONS  (STANDING):  acetaminophen     Tablet .. 975 milliGRAM(s) Oral every 6 hours  chlorhexidine 2% Cloths 1 Application(s) Topical <User Schedule>  heparin   Injectable 5000 Unit(s) SubCutaneous every 8 hours  lactated ringers. 1000 milliLiter(s) (125 mL/Hr) IV Continuous <Continuous>  piperacillin/tazobactam IVPB.. 3.375 Gram(s) IV Intermittent every 8 hours  potassium chloride  10 mEq/100 mL IVPB 10 milliEquivalent(s) IV Intermittent every 1 hour    MEDICATIONS  (PRN):  ketorolac   Injectable 15 milliGRAM(s) IV Push every 6 hours PRN Moderate Pain (4 - 6)  oxyCODONE    IR 5 milliGRAM(s) Oral every 6 hours PRN Severe Pain (7 - 10)      Physical Exam:  Constitutional: NAD, AOx3  CV: RRR S1S2   Lungs: CTA b/l, good air flow b/l  Abdomen: softly-distended, appropriately-tender. Midline vertical incision with steri strips clean, dry, intact  Extremities: no lower extremity edema or calf tenderness bilaterally; venodynes in place    LABS:      136    |  103    |  6<L>   ----------------------------<  92     3.4<L>   |  22     |  0.57       134<L>  |  104    |  5<L>   ----------------------------<  210<H>  3.9     |  19<L>  |  0.56   05-24    135    |  105    |  6<L>   ----------------------------<  196<H>  3.4<L>   |  18<L>  |  0.55     Ca    8.1<L>      26 May 2022 02:30  Ca    8.5        25 May 2022 00:45  Ca    8.5        24 May 2022 22:00  Phos  2.8       05-26  Phos  2.9       05-25  Phos  3.2       05-24  Mg     2.00      05-26  Mg     1.70      05-25  Mg     1.60      05-24    TPro  5.9<L>  /  Alb  2.8<L>  /  TBili  0.8    /  DBili  x      /  AST  9      /  ALT  10     /  AlkPhos  51     05-25  TPro  6.3    /  Alb  3.2<L>  /  TBili  1.3<H>  /  DBili  x      /  AST  13     /  ALT  9      /  AlkPhos  56     05-24    PT/INR - ( 25 May 2022 00:45 )   PT: 16.3 sec;   INR: 1.40 ratio    PTT - ( 25 May 2022 00:45 )  PTT:26.9 sec  Urinalysis Basic - ( 24 May 2022 14:25 )    Color: Yellow / Appearance: Clear / S.042 / pH: x  Gluc: x / Ketone: Moderate  / Bili: Negative / Urobili: <2 mg/dL   Blood: x / Protein: 30 mg/dL / Nitrite: Negative   Leuk Esterase: Negative / RBC: 9 /HPF / WBC 3 /HPF   Sq Epi: x / Non Sq Epi: 3 /HPF / Bacteria: Negative

## 2022-05-26 NOTE — PHYSICAL THERAPY INITIAL EVALUATION ADULT - PATIENT PROFILE REVIEW, REHAB EVAL
PT initial evaluation received and chart review completed. Pt agreeable to participate in PT evaluation. Pt cleared by PAUL Mckeon./yes

## 2022-05-26 NOTE — PROGRESS NOTE ADULT - ASSESSMENT
ASSESSMENT:  Patient is a 22 year old female with no PMHx who presented for second opinion after signing out AMA from [facility] where she presented on 5/22/22 with abdominal pain and fevers (admitted for treatment of TOA).  CTA Abdomen / Pelvis performed showing no contrast extravasation to suggest active bleeding.  Bilateral adnexal cysts, likely hemorrhagic on the right.  Tubular lesion in the right adnexa measuring 11.0 x 5.7cm with incomplete septations consistent with dilated fallopian tube, in keeping with the clinical history of tubo-ovarian abscess.  Small amount of low attenuating free fluid in the pelvis tracking up to the upper abdomen.  Findings likely represent serosanguineous fluid versus debris from pelvic inflammatory disease.  Patient was taken to the OR for a diagnostic laparoscopy converted to exploratory laparotomy, right salpingo-oophorectomy and open left ovarian cystectomy on 5/24/22.  Patient transferred to SICU post operatively for close hemodynamic monitoring.      PLAN:    NEUROLOGY:  - No active issues  - Pain control with Tylenol, Toradol and Oxycodone PRN      RESPIRATORY:  - Successfully extubated on 5/25/22  - Continuous pulse oximetry  - Maintain O2 saturation >92%    CARDIOVASCULAR:  - Initially hypotensive requiring vasopressors (likely secondary to sedation)  - Remains off vasopressors  - Keep MAP >65    GI / NUTRITION:  - NPO with sips and chips    RENAL / GENITOURINARY:  - LR @125cc/hr  - Monitor electrolytes and replete PRN  - Continue to monitor strict ins and outs q1 hour    HEMATOLOGIC:  - Received 2 units pRBC and 2 units of cryo intra-operatively  - Monitor daily CBC and transfuse PRN  - VTE prophylaxis with Heparin subcutaneous    INFECTIOUS DISEASE:  - Currently afebrile with leukocytosis of 18.52  - Continue with IV Zosyn    ENDOCRINOLOGY:  - No active issues  - Continue to monitor glucose on BMP (goal 140-180)      Disposition: SICU    -------------------------------------------------------------------------------------- ASSESSMENT:  Patient is a 22 year old female with no PMHx who presented for second opinion after signing out AMA from [facility] where she presented on 5/22/22 with abdominal pain and fevers (admitted for treatment of TOA).  CTA Abdomen / Pelvis performed showing no contrast extravasation to suggest active bleeding.  Bilateral adnexal cysts, likely hemorrhagic on the right.  Tubular lesion in the right adnexa measuring 11.0 x 5.7cm with incomplete septations consistent with dilated fallopian tube, in keeping with the clinical history of tubo-ovarian abscess.  Small amount of low attenuating free fluid in the pelvis tracking up to the upper abdomen.  Findings likely represent serosanguineous fluid versus debris from pelvic inflammatory disease.  Patient was taken to the OR for a diagnostic laparoscopy converted to exploratory laparotomy, right salpingo-oophorectomy and open left ovarian cystectomy on 5/24/22.  Patient transferred to SICU post operatively for close hemodynamic monitoring.    PLAN:  NEUROLOGY:  - No active issues  - Pain control with Tylenol, Toradol and increase Oxy to 10 PRN    RESPIRATORY:  - Successfully extubated on 5/25  - Continuous pulse oximetry  - Maintain O2 saturation >92% on 2L NC  - IS  - OOB to chair, PT consult    CARDIOVASCULAR:  - Initially hypotensive requiring vasopressors (likely d/t sedation)  - Remains off vasopressors  - Keep MAP >65    GI / NUTRITION:  - NPO with sips and chips  - ADAT to regular    RENAL / GENITOURINARY:  - LR @125cc/hr, down to 60cc/hr - d/c once tolerating diet  - Monitor electrolytes and replete PRN  - Continue to monitor strict ins and outs q1 hour    HEMATOLOGIC:  - Received 2 units pRBC and 2 units of cryo intra-operatively  - Monitor daily CBC and transfuse PRN  - VTE prophylaxis with Heparin subcutaneous    INFECTIOUS DISEASE:  - Currently afebrile with leukocytosis of 18.52  - Continue with IV Zosyn    ENDOCRINOLOGY:  - No active issues  - Continue to monitor glucose on BMP (goal 140-180)      Disposition: SICU    -------------------------------------------------------------------------------------- ASSESSMENT:  Patient is a 22 year old female with no PMHx who presented for second opinion after signing out AMA from [facility] where she presented on 5/22/22 with abdominal pain and fevers (admitted for treatment of TOA).  CTA Abdomen / Pelvis performed showing no contrast extravasation to suggest active bleeding.  Bilateral adnexal cysts, likely hemorrhagic on the right.  Tubular lesion in the right adnexa measuring 11.0 x 5.7cm with incomplete septations consistent with dilated fallopian tube, in keeping with the clinical history of tubo-ovarian abscess.  Small amount of low attenuating free fluid in the pelvis tracking up to the upper abdomen.  Findings likely represent serosanguineous fluid versus debris from pelvic inflammatory disease.  Patient was taken to the OR for a diagnostic laparoscopy converted to exploratory laparotomy, right salpingo-oophorectomy and open left ovarian cystectomy on 5/24/22.  Patient transferred to SICU post operatively for close hemodynamic monitoring.    PLAN:  NEUROLOGY:  - Increasing pain needs, Oxy 5q6hr -> 10q6hr  - c/w Tylenol, Toradol    RESPIRATORY:  - Successfully extubated on 5/25  - Continuous pulse oximetry  - Maintain O2 saturation >92% on 2L NC  - IS, OOB to chair, PT consult    CARDIOVASCULAR:  - No acute issues  - Remains off vasopressors  - Keep MAP >65    GI / NUTRITION:  - NGT dc'd  - Diet: advance as tolerated to regular    RENAL / GENITOURINARY:  - LR @125cc/hr, down to 60cc/hr - d/c once tolerating diet  - Monitor electrolytes and replete PRN  - Continue to monitor strict ins and outs q1 hour    HEMATOLOGIC:  - Received 2 units pRBC and 2 units of cryo intra-operatively  - Monitor daily CBC and transfuse PRN  - VTE prophylaxis with Heparin subcutaneous    INFECTIOUS DISEASE:  - Currently afebrile with leukocytosis of 18.52  - Continue with IV Zosyn    ENDOCRINOLOGY:  - No active issues  - Continue to monitor glucose on BMP (goal 140-180)      Disposition: SICU    --------------------------------------------------------------------------------------

## 2022-05-26 NOTE — DISCHARGE NOTE PROVIDER - NSDCMRMEDTOKEN_GEN_ALL_CORE_FT
acetaminophen 325 mg oral tablet: 3 tab(s) orally every 6 hours  amoxicillin-clavulanate 875 mg-125 mg oral tablet: 1 tab(s) orally every 12 hours  amoxicillin-clavulanate 875 mg-125 mg oral tablet: 1 tab(s) orally every 12 hours   ibuprofen 600 mg oral tablet: 1 tab(s) orally every 6 hours  oxyCODONE 5 mg oral tablet: 1 tab(s) orally every 6 hours MDD:MDD:4

## 2022-05-26 NOTE — DIETITIAN INITIAL EVALUATION ADULT - PERTINENT LABORATORY DATA
05-26    136  |  103  |  6<L>  ----------------------------<  92  3.4<L>   |  22  |  0.57    Ca    8.1<L>      26 May 2022 02:30  Phos  2.8     05-26  Mg     2.00     05-26    TPro  5.9<L>  /  Alb  2.8<L>  /  TBili  0.8  /  DBili  x   /  AST  9   /  ALT  10  /  AlkPhos  51  05-25  A1C with Estimated Average Glucose Result: 5.1 % (05-25-22 @ 00:45)

## 2022-05-26 NOTE — DISCHARGE NOTE PROVIDER - NSDCCPTREATMENT_GEN_ALL_CORE_FT
PRINCIPAL PROCEDURE  Procedure: Exploratory laparotomy with salpingo-oophorectomy  Findings and Treatment:

## 2022-05-26 NOTE — DISCHARGE NOTE PROVIDER - CARE PROVIDER_API CALL
[facility],   [facility]  Oncology Building, Basement Level   [address], [place] [address]  Phone: [phone]  Fax: (   )    -  Follow Up Time: 2 weeks

## 2022-05-26 NOTE — PROGRESS NOTE ADULT - SUBJECTIVE AND OBJECTIVE BOX
SICU Daily Progress Note  =====================================================  Interval / Overnight Events: No acute events overnight.      HPI:  Patient is a 22 year old female with no PMHx who presented for second opinion after signing out AMA from [facility] where she presented on 5/22/22 with abdominal pain and fevers (admitted for treatment of TOA). (24 May 2022 14:29)      PAST MEDICAL & SURGICAL HISTORY:  Abnormal uterine bleeding      ALLERGIES:  No Known Allergies    --------------------------------------------------------------------------------------    MEDICATIONS:    Neurologic Medications  acetaminophen     Tablet .. 975 milliGRAM(s) Oral every 6 hours  ketorolac   Injectable 15 milliGRAM(s) IV Push every 6 hours PRN Moderate Pain (4 - 6)  oxyCODONE    IR 5 milliGRAM(s) Oral every 6 hours PRN Severe Pain (7 - 10)    Gastrointestinal Medications  lactated ringers. 1000 milliLiter(s) IV Continuous <Continuous>    Hematologic/Oncologic Medications  heparin   Injectable 5000 Unit(s) SubCutaneous every 8 hours    Antimicrobial/Immunologic Medications  piperacillin/tazobactam IVPB.. 3.375 Gram(s) IV Intermittent every 8 hours    Topical/Other Medications  chlorhexidine 2% Cloths 1 Application(s) Topical <User Schedule>    --------------------------------------------------------------------------------------    VITAL SIGNS:  T(C): 36.4 (26 May 2022 00:00), Max: 36.9 (25 May 2022 08:00)  T(F): 97.5 (26 May 2022 00:00), Max: 98.4 (25 May 2022 08:00)  HR: 99 (26 May 2022 01:00) (73 - 99)  BP: 112/77 (26 May 2022 01:00) (96/63 - 117/86)  BP(mean): 89 (26 May 2022 01:00) (67 - 96)  ABP: 126/86 (25 May 2022 12:00) (104/69 - 135/86)  ABP(mean): 103 (25 May 2022 12:00) (71 - 106)  RR: 27 (26 May 2022 01:00) (17 - 29)  SpO2: 96% (26 May 2022 01:00) (91% - 100%)    --------------------------------------------------------------------------------------    INS AND OUTS:    24 May 2022 07:01  -  25 May 2022 07:00  --------------------------------------------------------  IN:    Dexmedetomidine: 44.4 mL    FentaNYL: 148.2 mL    Lactated Ringers: 1125 mL    Lactated Ringers Bolus: 500 mL  Total IN: 1817.6 mL    OUT:    Indwelling Catheter - Urethral (mL): 50 mL    Nasogastric/Oral tube (mL): 200 mL    Voided (mL): 590 mL  Total OUT: 840 mL    Total NET: 977.6 mL      25 May 2022 07:01  -  26 May 2022 02:28  --------------------------------------------------------  IN:    IV PiggyBack: 400 mL    Lactated Ringers: 2250 mL  Total IN: 2650 mL    OUT:    Indwelling Catheter - Urethral (mL): 725 mL    Nasogastric/Oral tube (mL): 150 mL    Voided (mL): 200 mL  Total OUT: 1075 mL    Total NET: 1575 mL    --------------------------------------------------------------------------------------    EXAM    NEUROLOGY  Exam: Normal, in no acute distress.    HEENT  Exam: Normocephalic, atraumatic.    RESPIRATORY  Exam: Normal expansion / effort.    CARDIOVASCULAR  Exam: S1, S2.  Regular rate and rhythm.    GI/NUTRITION  Exam: Abdomen soft, Non-tender, Non-distended.  Incision clean, dry and intact.  Current Diet: NPO with sips and chips    MUSCULOSKELETAL  Exam: All extremities moving spontaneously without limitations.      METABOLIC / FLUIDS / ELECTROLYTES  lactated ringers. 1000 milliLiter(s) IV Continuous <Continuous>      HEMATOLOGIC  [x] VTE Prophylaxis: heparin   Injectable 5000 Unit(s) SubCutaneous every 8 hours      INFECTIOUS DISEASE  Antimicrobials/Immunologic Medications:  piperacillin/tazobactam IVPB.. 3.375 Gram(s) IV Intermittent every 8 hours      TUBES / LINES / DRAINS  [x] Peripheral IV  [] Central Venous Line     	[] R	[] L	[] IJ	[] Fem	[] SC	Date Placed:   [] Arterial Line		[] R	[] L	[] Fem	[] Rad	[] Ax	Date Placed:   [] PICC		[] Midline		[] Mediport  [] Urinary Catheter		Date Placed:   [x] Necessity of urinary, arterial, and venous catheters discussed    -------------------------------------------------------------------------------------- SICU Daily Progress Note  =====================================================  Interval / Overnight Events: No acute events overnight.  - Passing gas, no BM  - inc pain needs, oxy 5 -> 10  - OOB to chair  - ADAT  - dec mIVF rate until tolerating diet  - FLOORS      HPI:  Patient is a 22 year old female with no PMHx who presented for second opinion after signing out AMA from [facility] where she presented on 5/22/22 with abdominal pain and fevers (admitted for treatment of TOA). (24 May 2022 14:29)      PAST MEDICAL & SURGICAL HISTORY:  Abnormal uterine bleeding      ALLERGIES:  No Known Allergies    --------------------------------------------------------------------------------------    MEDICATIONS:    Neurologic Medications  acetaminophen     Tablet .. 975 milliGRAM(s) Oral every 6 hours  ketorolac   Injectable 15 milliGRAM(s) IV Push every 6 hours PRN Moderate Pain (4 - 6)  oxyCODONE    IR 5 milliGRAM(s) Oral every 6 hours PRN Severe Pain (7 - 10)    Gastrointestinal Medications  lactated ringers. 1000 milliLiter(s) IV Continuous <Continuous>    Hematologic/Oncologic Medications  heparin   Injectable 5000 Unit(s) SubCutaneous every 8 hours    Antimicrobial/Immunologic Medications  piperacillin/tazobactam IVPB.. 3.375 Gram(s) IV Intermittent every 8 hours    Topical/Other Medications  chlorhexidine 2% Cloths 1 Application(s) Topical <User Schedule>    --------------------------------------------------------------------------------------    VITAL SIGNS:  T(C): 36.4 (26 May 2022 00:00), Max: 36.9 (25 May 2022 08:00)  T(F): 97.5 (26 May 2022 00:00), Max: 98.4 (25 May 2022 08:00)  HR: 99 (26 May 2022 01:00) (73 - 99)  BP: 112/77 (26 May 2022 01:00) (96/63 - 117/86)  BP(mean): 89 (26 May 2022 01:00) (67 - 96)  ABP: 126/86 (25 May 2022 12:00) (104/69 - 135/86)  ABP(mean): 103 (25 May 2022 12:00) (71 - 106)  RR: 27 (26 May 2022 01:00) (17 - 29)  SpO2: 96% (26 May 2022 01:00) (91% - 100%)    --------------------------------------------------------------------------------------    INS AND OUTS:    24 May 2022 07:01  -  25 May 2022 07:00  --------------------------------------------------------  IN:    Dexmedetomidine: 44.4 mL    FentaNYL: 148.2 mL    Lactated Ringers: 1125 mL    Lactated Ringers Bolus: 500 mL  Total IN: 1817.6 mL    OUT:    Indwelling Catheter - Urethral (mL): 50 mL    Nasogastric/Oral tube (mL): 200 mL    Voided (mL): 590 mL  Total OUT: 840 mL    Total NET: 977.6 mL      25 May 2022 07:01  -  26 May 2022 02:28  --------------------------------------------------------  IN:    IV PiggyBack: 400 mL    Lactated Ringers: 2250 mL  Total IN: 2650 mL    OUT:    Indwelling Catheter - Urethral (mL): 725 mL    Nasogastric/Oral tube (mL): 150 mL    Voided (mL): 200 mL  Total OUT: 1075 mL    Total NET: 1575 mL    --------------------------------------------------------------------------------------    EXAM    NEUROLOGY  Exam: Normal, in no acute distress.    HEENT  Exam: Normocephalic, atraumatic.    RESPIRATORY  Exam: Normal expansion / effort.    CARDIOVASCULAR  Exam: S1, S2.  Regular rate and rhythm.    GI/NUTRITION  Exam: Abdomen soft, Non-tender, Non-distended.  Incision clean, dry and intact.  Current Diet: NPO with sips and chips    MUSCULOSKELETAL  Exam: All extremities moving spontaneously without limitations.      METABOLIC / FLUIDS / ELECTROLYTES  lactated ringers. 1000 milliLiter(s) IV Continuous <Continuous>      HEMATOLOGIC  [x] VTE Prophylaxis: heparin   Injectable 5000 Unit(s) SubCutaneous every 8 hours      INFECTIOUS DISEASE  Antimicrobials/Immunologic Medications:  piperacillin/tazobactam IVPB.. 3.375 Gram(s) IV Intermittent every 8 hours      TUBES / LINES / DRAINS  [x] Peripheral IV  [] Central Venous Line     	[] R	[] L	[] IJ	[] Fem	[] SC	Date Placed:   [] Arterial Line		[] R	[] L	[] Fem	[] Rad	[] Ax	Date Placed:   [] PICC		[] Midline		[] Mediport  [] Urinary Catheter		Date Placed:   [x] Necessity of urinary, arterial, and venous catheters discussed    --------------------------------------------------------------------------------------

## 2022-05-26 NOTE — PHYSICAL THERAPY INITIAL EVALUATION ADULT - IMPAIRMENTS FOUND, PT EVAL
aerobic capacity/endurance/decreased midline orientation/ergonomics and body mechanics/gait, locomotion, and balance/gross motor/posture

## 2022-05-26 NOTE — DIETITIAN INITIAL EVALUATION ADULT - NSFNSGIIOFT_GEN_A_CORE
05-25-22 @ 07:01  -  05-26-22 @ 07:00  --------------------------------------------------------  OUT:    Nasogastric/Oral tube (mL): 150 mL  Total OUT: 150 mL    Total NET: -150 mL

## 2022-05-26 NOTE — DISCHARGE NOTE PROVIDER - NSDCFUADDINST_GEN_ALL_CORE_FT
Return to your regular way of eating.  Resume normal activity as tolerated, but no heavy lifting or strenuous activity for 6 weeks.  No driving for next 2 weeks and/or while on narcotic pain medication.  Complete vaginal rest, no tampons, no douching, no tub bathing, no sexual activities for 6 weeks unless otherwise instructed by your doctor.  Call your doctor with any signs and symptoms of infection such as fever, chills, nausea or vomiting.  Call your doctor with redness or swelling at the incision site, fluid leakage or wound separation.  Call your doctor if you're unable to tolerate food or have difficulty urinating.  Call your doctor if you have pain that is not relieved by your prescribed medications.  Notify your doctor with any other concerns.  Follow up [facility] in 2 weeks

## 2022-05-26 NOTE — DIETITIAN INITIAL EVALUATION ADULT - PERTINENT MEDS FT
MEDICATIONS  (STANDING):  acetaminophen     Tablet .. 975 milliGRAM(s) Oral every 6 hours  chlorhexidine 2% Cloths 1 Application(s) Topical <User Schedule>  heparin   Injectable 5000 Unit(s) SubCutaneous every 8 hours  lactated ringers. 1000 milliLiter(s) (60 mL/Hr) IV Continuous <Continuous>    MEDICATIONS  (PRN):  ketorolac   Injectable 15 milliGRAM(s) IV Push every 6 hours PRN Moderate Pain (4 - 6)  oxyCODONE    IR 10 milliGRAM(s) Oral every 6 hours PRN Severe Pain (7 - 10)

## 2022-05-26 NOTE — PHYSICAL THERAPY INITIAL EVALUATION ADULT - ADDITIONAL COMMENTS
Pt previously lived with her grandma; however, plans to move-in with her mother temporarily upon discharge -- one-story house with ~7 steps to enter. Pt reports she was independent with mobility, self-care, and ADLs; no assistive devices utilized.

## 2022-05-26 NOTE — DISCHARGE NOTE PROVIDER - NSDCCPCAREPLAN_GEN_ALL_CORE_FT
PRINCIPAL DISCHARGE DIAGNOSIS  Diagnosis: Tubo-ovarian abscess  Assessment and Plan of Treatment:       SECONDARY DISCHARGE DIAGNOSES  Diagnosis: Tubo-ovarian abscess  Assessment and Plan of Treatment:     Diagnosis: Sepsis  Assessment and Plan of Treatment:

## 2022-05-26 NOTE — PHYSICAL THERAPY INITIAL EVALUATION ADULT - PERTINENT HX OF CURRENT PROBLEM, REHAB EVAL
Pt is a 22 year old female presenting with abdominal pain associated with fevers, chills, and nausea x2 days. In ED, Hgb stable at 7.4; however, patient with distended abdomen, +FAST scan and tachycardia; concerning for hemoperitoneum. Pt admitted for emergent OR for diagnostic laparoscopy. Found to have tubal ovarian abscess and endometriosis.

## 2022-05-26 NOTE — DIETITIAN INITIAL EVALUATION ADULT - ADD RECOMMEND
1. Monitor weights, labs, BM's, skin integrity, p.o. intake and edema. 2. Follow pt as per protocol. 3. Suggest outpatient follow up with CDE RD for long term dietary management for weight reduction.

## 2022-05-26 NOTE — PHYSICAL THERAPY INITIAL EVALUATION ADULT - DIAGNOSIS, PT EVAL
Upon evaluation, pt presents with impairments in transfers, balance, and gait. Pt would benefit from skilled PT services in the acute care setting to address impairments to facilitate return to prior level of function.

## 2022-05-26 NOTE — DISCHARGE NOTE PROVIDER - PROVIDER TOKENS
FREE:[LAST:[[facility]],PHONE:[phone]],FAX:[(   [phone]    -],ADDRESS:[[facility]  Oncology Building, Basement Level   [address] [place] [address]],FOLLOWUP:[2 weeks]]

## 2022-05-26 NOTE — CHART NOTE - NSCHARTNOTEFT_GEN_A_CORE
Patient evaluated at bedside this morning. Pt notes lower abdominal pain bilaterally, responsive to medications. Tolerating sips and chips. Passing flatus. Denies fevers, chills, SOB, chest pain, palpitation or leg pain / swelling.     Objective  T(C): 36.4 (05-26-22 @ 00:00), Max: 36.8 (05-25-22 @ 20:00)  HR: 99 (05-26-22 @ 01:00) (78 - 99)  BP: 112/77 (05-26-22 @ 01:00) (100/73 - 117/86)  RR: 27 (05-26-22 @ 01:00) (17 - 29)  SpO2: 96% (05-26-22 @ 01:00) (95% - 99%)  Wt(kg): --    05-24 @ 07:01  -  05-25 @ 07:00  --------------------------------------------------------  IN: 1817.6 mL / OUT: 840 mL / NET: 977.6 mL    05-25 @ 07:01  -  05-26 @ 02:30  --------------------------------------------------------  IN: 2650 mL / OUT: 1075 mL / NET: 1575 mL        Gen: NAD  Abd: Distended and tympanic, mild b/l LQ tenderness otherwise without pain, no rebound, no guarding   Incision: midline vertical incision c/d/i, right port site c/d/i  Ext: NT BL    acetaminophen     Tablet .. 975 milliGRAM(s) Oral every 6 hours  chlorhexidine 2% Cloths 1 Application(s) Topical <User Schedule>  heparin   Injectable 5000 Unit(s) SubCutaneous every 8 hours  ketorolac   Injectable 15 milliGRAM(s) IV Push every 6 hours PRN  lactated ringers. 1000 milliLiter(s) IV Continuous <Continuous>  oxyCODONE    IR 5 milliGRAM(s) Oral every 6 hours PRN  piperacillin/tazobactam IVPB.. 3.375 Gram(s) IV Intermittent every 8 hours    A/P: 21 yo POD#2 s/p dx laparoscopy converted to ex Lap Right Salpingo-oophorectomy , Left cystectomy, partial omentectomy, evacuation of 1L of pus in setting of ruptured endometrioma with superimposed infection.    Neuro: Pain well controlled on IV Tylenol and Toradol  - s/p precedex gtt, Levophed/Propofol.   CV: Hemodynamically stable.   - s/p 2UpRBC +500cc albumin + 2U Cryo intra-op.   - No evidence of ongoing bleeding given lack of tenderness on exam,    -  VSS with adequate UOP.   - Non-anion gap acidosis (mixed metabolic / respiratory) resolved with nl lactate   Pulm: O2 sat WNL on VCAC (FiO2 40%), extubation per SICU.  GI: NPO   : Pt voiding spontaneously   Heme: DVT ppx: HSQ, SCDs while in bed  ID: Afebrile. Leukocytosis improving.   - Zosyn (5/24- ) for intraabdominal infection.   - AM CBC with diff.   - f/u Abdominal Fluid Cx (5/24)  - GC/CT Neg from [facility]  FEN: LR@125, Replete electrolytes PRN   Dispo: Continued inpatient care, appreciate excellent SICU care       Veena Parkinson, PGY-2

## 2022-05-26 NOTE — DISCHARGE NOTE PROVIDER - HOSPITAL COURSE
22y yo F s/p dx laparoscopy-> Ex Lap, RSO, L cystectomy, omentectomy, evacuation of 1L of pus on 5/24 after patient presented with hemoperitoneum and b/l TOA. See operative report for full details. EBL: 200    POD 0: Pt admitted to SICU given hemodynamic instability, need for pressors, and inability to safely extubate. Pt remained intubated and had orogastric tube in place. Pt kept NPO and Harden catheter in situ.   - receiving Zosyn for treatment of TOAs   POD 1: Pressors discontinued and pt extubated.   - NPO status maintained   POD 2: Hemodynamically stable. Pt out of bed to chair   - Harden catheter removed, voiding spontaneously   - OGT removed, diet advanced to regular as tolerated    22y yo F s/p dx laparoscopy-> Ex Lap, RSO, L cystectomy, omentectomy, evacuation of 1L of pus on 5/24 after patient presented with hemoperitoneum and b/l TOA. See operative report for full details. EBL: 200    POD 0: Pt admitted to SICU given hemodynamic instability, need for pressors, and inability to safely extubate. Pt remained intubated and had orogastric tube in place. Pt kept NPO and Harden catheter in situ.   - receiving Zosyn for treatment of TOAs   POD 1: Pressors discontinued and pt extubated.   - NPO status maintained   POD 2: Hemodynamically stable. Pt out of bed to chair   - Harden catheter removed, voiding spontaneously   - OGT removed, diet advanced to regular as tolerated   POD 3: Patient remained stable, meeting recovery milestones   - abx transitioned from Zosyn to Unasyn 22y yo F s/p dx laparoscopy-> Ex Lap, RSO, L cystectomy, omentectomy, evacuation of 1L of pus on 5/24 after patient presented with hemoperitoneum and b/l TOA. See operative report for full details. EBL: 200    POD 0: Pt admitted to SICU given hemodynamic instability, need for pressors, and inability to safely extubate. Pt remained intubated and had orogastric tube in place. Pt kept NPO and Harden catheter in situ.   - receiving Zosyn for treatment of TOAs   POD 1: Pressors discontinued and pt extubated.   - NPO status maintained   POD 2: Hemodynamically stable. Pt out of bed to chair   - Harden catheter removed, voiding spontaneously   - OGT removed, diet advanced to regular as tolerated   POD 3: Patient remained stable, meeting recovery milestones   - abx transitioned from Zosyn to Unasyn   Patient remained on IV antibiotics and was improving. Patient was then transitioned to Augmentin on 5/28. Patient to continue augmentin until 6/11( for a total of 14 days)   On day of discharge patient was afebrile, pain controlled on PO pain medication and able to void spontaneously. Follow up in 2 weeks at [facility]

## 2022-05-26 NOTE — PHYSICAL THERAPY INITIAL EVALUATION ADULT - PLANNED THERAPY INTERVENTIONS, PT EVAL
bed mobility training/gait training/neuromuscular re-education/postural re-education/strengthening/transfer training

## 2022-05-26 NOTE — PROGRESS NOTE ADULT - ASSESSMENT
23 yo POD#2 s/p dx laparoscopy converted to ex lap right salpingo-oophorectomy , left cystectomy, partial omentectomy, evacuation of 1L of purulent fluid in setting of ruptured endometrioma with superimposed infection. Patient extubated 5/25 and remains afebrile, overall clinically improved    Neuro  -Toradol, Tylenol, Oxycodone PRN for pain   - s/p precedex gtt, Levophed/Propofol.     CV  - Hemodynamically stable   - s/p 2UpRBC +500cc albumin + 2U Cryo intra-op.   - H/H stable postop 8.6/27.7 -> 8.5/28.3   - Non-anion gap acidosis (mixed metabolic / respiratory) resolved   - lactate cleared     Pulm:   - s/p ETT (5/24-25), O2 sats wnl on 2L NC, wean off as tolerated   - Incentive spirometry     GI  - Advance to CLD, advance diet as tolerated. Pepcid, senna, mylicon       - s/p Harden, voiding spontaneously     Heme  - HSQ, SCDs while in bed  - Encourage OOB to chair, ambulation.     ID   - Afebrile. Downtrending leukocytosis 19.54 ->18.52 ->16.22.   - Continue Zosyn (5/24- ) for intraabdominal infection.   - Bcx (5/24) - NGTD (p)   - Ucx (5/24) - neg   - f/u abdominal fluid cx (5/24)  - f/u GC/CT    FEN   - LR@125, Replete electrolytes PRN     Dispo: Appreciate SICU care

## 2022-05-27 LAB
ANION GAP SERPL CALC-SCNC: 10 MMOL/L — SIGNIFICANT CHANGE UP (ref 7–14)
BUN SERPL-MCNC: 6 MG/DL — LOW (ref 7–23)
CALCIUM SERPL-MCNC: 8.7 MG/DL — SIGNIFICANT CHANGE UP (ref 8.4–10.5)
CHLORIDE SERPL-SCNC: 102 MMOL/L — SIGNIFICANT CHANGE UP (ref 98–107)
CO2 SERPL-SCNC: 24 MMOL/L — SIGNIFICANT CHANGE UP (ref 22–31)
CREAT SERPL-MCNC: 0.51 MG/DL — SIGNIFICANT CHANGE UP (ref 0.5–1.3)
EGFR: 134 ML/MIN/1.73M2 — SIGNIFICANT CHANGE UP
GLUCOSE SERPL-MCNC: 86 MG/DL — SIGNIFICANT CHANGE UP (ref 70–99)
HCT VFR BLD CALC: 30.3 % — LOW (ref 34.5–45)
HGB BLD-MCNC: 9.2 G/DL — LOW (ref 11.5–15.5)
HIV 1+2 AB+HIV1 P24 AG SERPL QL IA: SIGNIFICANT CHANGE UP
MAGNESIUM SERPL-MCNC: 2 MG/DL — SIGNIFICANT CHANGE UP (ref 1.6–2.6)
MCHC RBC-ENTMCNC: 22.1 PG — LOW (ref 27–34)
MCHC RBC-ENTMCNC: 30.4 GM/DL — LOW (ref 32–36)
MCV RBC AUTO: 72.8 FL — LOW (ref 80–100)
NRBC # BLD: 0 /100 WBCS — SIGNIFICANT CHANGE UP
NRBC # FLD: 0 K/UL — SIGNIFICANT CHANGE UP
PHOSPHATE SERPL-MCNC: 3.3 MG/DL — SIGNIFICANT CHANGE UP (ref 2.5–4.5)
PLATELET # BLD AUTO: 601 K/UL — HIGH (ref 150–400)
POTASSIUM SERPL-MCNC: 3.7 MMOL/L — SIGNIFICANT CHANGE UP (ref 3.5–5.3)
POTASSIUM SERPL-SCNC: 3.7 MMOL/L — SIGNIFICANT CHANGE UP (ref 3.5–5.3)
RBC # BLD: 4.16 M/UL — SIGNIFICANT CHANGE UP (ref 3.8–5.2)
RBC # FLD: 22.4 % — HIGH (ref 10.3–14.5)
SODIUM SERPL-SCNC: 136 MMOL/L — SIGNIFICANT CHANGE UP (ref 135–145)
WBC # BLD: 13.17 K/UL — HIGH (ref 3.8–10.5)
WBC # FLD AUTO: 13.17 K/UL — HIGH (ref 3.8–10.5)

## 2022-05-27 PROCEDURE — 99222 1ST HOSP IP/OBS MODERATE 55: CPT

## 2022-05-27 RX ORDER — AMPICILLIN SODIUM AND SULBACTAM SODIUM 250; 125 MG/ML; MG/ML
3 INJECTION, POWDER, FOR SUSPENSION INTRAMUSCULAR; INTRAVENOUS ONCE
Refills: 0 | Status: COMPLETED | OUTPATIENT
Start: 2022-05-27 | End: 2022-05-27

## 2022-05-27 RX ORDER — AMPICILLIN SODIUM AND SULBACTAM SODIUM 250; 125 MG/ML; MG/ML
3 INJECTION, POWDER, FOR SUSPENSION INTRAMUSCULAR; INTRAVENOUS EVERY 6 HOURS
Refills: 0 | Status: DISCONTINUED | OUTPATIENT
Start: 2022-05-28 | End: 2022-05-29

## 2022-05-27 RX ORDER — AMPICILLIN SODIUM AND SULBACTAM SODIUM 250; 125 MG/ML; MG/ML
INJECTION, POWDER, FOR SUSPENSION INTRAMUSCULAR; INTRAVENOUS
Refills: 0 | Status: DISCONTINUED | OUTPATIENT
Start: 2022-05-27 | End: 2022-05-29

## 2022-05-27 RX ORDER — BENZOCAINE AND MENTHOL 5; 1 G/100ML; G/100ML
1 LIQUID ORAL
Refills: 0 | Status: DISCONTINUED | OUTPATIENT
Start: 2022-05-27 | End: 2022-05-30

## 2022-05-27 RX ADMIN — AMPICILLIN SODIUM AND SULBACTAM SODIUM 200 GRAM(S): 250; 125 INJECTION, POWDER, FOR SUSPENSION INTRAMUSCULAR; INTRAVENOUS at 17:44

## 2022-05-27 RX ADMIN — Medication 975 MILLIGRAM(S): at 17:44

## 2022-05-27 RX ADMIN — Medication 975 MILLIGRAM(S): at 18:14

## 2022-05-27 RX ADMIN — HEPARIN SODIUM 5000 UNIT(S): 5000 INJECTION INTRAVENOUS; SUBCUTANEOUS at 13:43

## 2022-05-27 RX ADMIN — Medication 600 MILLIGRAM(S): at 18:14

## 2022-05-27 RX ADMIN — Medication 100 MILLIGRAM(S): at 21:48

## 2022-05-27 RX ADMIN — OXYCODONE HYDROCHLORIDE 5 MILLIGRAM(S): 5 TABLET ORAL at 18:14

## 2022-05-27 RX ADMIN — Medication 600 MILLIGRAM(S): at 13:45

## 2022-05-27 RX ADMIN — SODIUM CHLORIDE 60 MILLILITER(S): 9 INJECTION, SOLUTION INTRAVENOUS at 07:45

## 2022-05-27 RX ADMIN — HEPARIN SODIUM 5000 UNIT(S): 5000 INJECTION INTRAVENOUS; SUBCUTANEOUS at 21:48

## 2022-05-27 RX ADMIN — SIMETHICONE 80 MILLIGRAM(S): 80 TABLET, CHEWABLE ORAL at 05:30

## 2022-05-27 RX ADMIN — SIMETHICONE 80 MILLIGRAM(S): 80 TABLET, CHEWABLE ORAL at 17:44

## 2022-05-27 RX ADMIN — Medication 600 MILLIGRAM(S): at 05:30

## 2022-05-27 RX ADMIN — Medication 975 MILLIGRAM(S): at 13:16

## 2022-05-27 RX ADMIN — OXYCODONE HYDROCHLORIDE 5 MILLIGRAM(S): 5 TABLET ORAL at 17:50

## 2022-05-27 RX ADMIN — Medication 600 MILLIGRAM(S): at 17:44

## 2022-05-27 RX ADMIN — Medication 975 MILLIGRAM(S): at 13:54

## 2022-05-27 RX ADMIN — Medication 600 MILLIGRAM(S): at 13:17

## 2022-05-27 RX ADMIN — Medication 975 MILLIGRAM(S): at 05:29

## 2022-05-27 RX ADMIN — HEPARIN SODIUM 5000 UNIT(S): 5000 INJECTION INTRAVENOUS; SUBCUTANEOUS at 05:29

## 2022-05-27 NOTE — CONSULT NOTE ADULT - ATTENDING COMMENTS
22F with ruptured TOA, leukocytosis, diarrhea, s/p exlap  -improving  -await cdiff test for diarrhea  -dc zosyn  -unasyn 3 gm iv q6  -cx negative so far  -check HIV test  -wbc improving  -change to Augmentin 875 mg po bid in 36 hours if no issues  -total 14 days abx postop     Oskar Serrano  Attending Physician   Division of Infectious Disease  Office #[phone]  Available on [employer] Teams also  After 5pm/weekend or no response, call #[phone]    ID coverage available over Memorial Day 3-day weekend (May 28 - May30) if needed. Call #[phone] for questions/concerns.

## 2022-05-27 NOTE — PROGRESS NOTE ADULT - SUBJECTIVE AND OBJECTIVE BOX
Gyn ONC Progress Note POD#3 HD#4    Subjective:   Pt seen and examined at bedside. Overnight patient had 4 loose watery bowel movement. Otherwise pain well controlled. Patient ambulating. Tolerating regular diet. Pt denies fever, chills, chest pain, SOB, nausea, vomiting, lightheadedness, dizziness.      Objective:  T(F): 98.5 (05-27-22 @ 05:25), Max: 99.1 (05-26-22 @ 21:29)  HR: 95 (05-27-22 @ 05:25) (93 - 112)  BP: 121/85 (05-27-22 @ 05:25) (114/74 - 136/92)  RR: 18 (05-27-22 @ 05:25) (17 - 36)  SpO2: 99% (05-27-22 @ 05:25) (88% - 100%)  Wt(kg): --  I&O's Summary    26 May 2022 07:01  -  27 May 2022 07:00  --------------------------------------------------------  IN: 2000 mL / OUT: 3800 mL / NET: -1800 mL    MEDICATIONS  (STANDING):  acetaminophen     Tablet .. 975 milliGRAM(s) Oral every 6 hours  chlorhexidine 2% Cloths 1 Application(s) Topical <User Schedule>  heparin   Injectable 5000 Unit(s) SubCutaneous every 8 hours  ibuprofen  Tablet. 600 milliGRAM(s) Oral every 6 hours  lactated ringers. 1000 milliLiter(s) (60 mL/Hr) IV Continuous <Continuous>  senna 2 Tablet(s) Oral at bedtime  simethicone 80 milliGRAM(s) Chew two times a day    MEDICATIONS  (PRN):  oxyCODONE    IR 5 milliGRAM(s) Oral every 4 hours PRN Moderate Pain (4 - 6)  oxyCODONE    IR 5 milliGRAM(s) Oral every 4 hours PRN Severe Pain (7 - 10)      Physical Exam:  Constitutional: NAD, AOx3  CV: RRR S1S2   Lungs: CTA b/l, good air flow b/l  Abdomen: softly-distended, appropriately-tender. Midline vertical incision with steri strips clean, dry, intact  Extremities: no lower extremity edema or calf tenderness bilaterally; venodynes in place      LABS:            9.2      13.17 )------------( 601        ( 05-27-22 @ 05:53 )            30.3    05-27    136    |  102    |  6<L>   ----------------------------<  86     3.7     |  24     |  0.51   05-26    136    |  103    |  6<L>   ----------------------------<  92     3.4<L>   |  22     |  0.57     Ca    8.7        27 May 2022 05:53  Ca    8.1<L>      26 May 2022 02:30  Phos  3.3       05-27  Phos  2.8       05-26  Mg     2.00      05-27  Mg     2.00      05-26

## 2022-05-27 NOTE — PROGRESS NOTE ADULT - ASSESSMENT
21 yo POD#3 s/p dx laparoscopy converted to ex lap right salpingo-oophorectomy , left cystectomy, partial omentectomy, evacuation of 1L of purulent fluid in setting of ruptured endometrioma with superimposed infection. Patient extubated 5/25 and remains afebrile, overall clinically improved    Neuro  - Tylenol, Motrin, Oxycodone for Pain control  - s/p precedex gtt, Levophed/Propofol.     CV  - Hemodynamically stablel; f/u AM Labs  - s/p 2UpRBC +500cc albumin + 2U Cryo intra-op.   - H/H stable postop 8.6/27.7 -> 8.5/28.3   - Non-anion gap acidosis (mixed metabolic / respiratory) resolved   - lactate cleared     Pulm:   - s/p ETT (5/24-25), O2 sats wnl on 2L NC, wean off as tolerated   - Incentive spirometry     GI  - Regular Diet, advance diet as tolerated. Pepcid, senna, mylicon   - Loose Stools, CDiff isolation and PCR today      - s/p Harden, voiding spontaneously     Heme  - HSQ, SCDs while in bed  - Encourage OOB to chair, ambulation.     ID   - Afebrile. Downtrending leukocytosis 19.54 ->18.52 ->16.22.   - Continue Zosyn (5/24- ) for intraabdominal infection.   - Consult ID for ABx recs  - Bcx (5/24) - NGTD (p)   - Ucx (5/24) - neg   - f/u abdominal fluid cx (5/24): NGTD  - GC/CT Negative    FEN   - SLIV    Orlin Gutierrez PGY3

## 2022-05-27 NOTE — CONSULT NOTE ADULT - SUBJECTIVE AND OBJECTIVE BOX
Patient is a 22y old  Female who presents with a chief complaint of Hemorrhagic cysts, TOA (27 May 2022 07:16)    HPI:  is a 22 year old lady with abdominal pain and fevers since 5/20 and admission to [facility] on 5/22 for treatment of TOA, now presented to [facility] on 5/24 after singing out AMA. In [facility] pt was found to have a TOA and elevated  and was counseled on surgical intervention. Patient was told she may need removal of her reproductive organs and patient left [place] for a second opinion. Also concern for hemorrhagic cysts due to drop in Hgb from 9.6 -> 7.5 while inpatient. Patient denies needing blood transfusion. Here she had low grade fever to 100.3 with leukocytosis to 19k. CT a/p here showed tubular lesion in the right adnexa measuring 11.0 x 5.7 cm with incomplete septations. Pt started on zosyn. Diagnostic laparoscopy performed on 5/24 with dense purulent adhesions of omentum noted to abdominal wall. Decision made to convert to ex-lap due to poor visualization from dense adhesions and limited mobility of pelvic structures. 1L of purulent thick brown fluid evacuated from abdomen. 10cm right adnexal mass with intraop rupture revealing chocolate colored fluid consistent with endometrioma. Right ovary, right fallopian tube, left ovarian cyst, omentum were removed. Patient transferred to SICU post operatively for close hemodynamic monitoring and now stable and transferred to the medical floor on 5/26. ID consulted for abx management. Overnight patient had 4 loose watery bowel movement.       REVIEW OF SYSTEMS  [  ] ROS unobtainable because:    [ x ] All other systems negative except as noted below    Constitutional:  [ ] fever [ ] chills  [ ] weight loss  [ ]night sweat  [ ]poor appetite/PO intake [ ]fatigue   Skin:  [ ] rash [ ] phlebitis	  Eyes: [ ] icterus [ ] pain  [ ] discharge	  ENMT: [ ] sore throat  [ ] thrush [ ] ulcers [ ] exudates [ ]anosmia  Respiratory: [ ] dyspnea [ ] hemoptysis [ ] cough [ ] sputum	  Cardiovascular:  [ ] chest pain [ ] palpitations [ ] edema	  Gastrointestinal:  [ ] nausea [ ] vomiting [ ] diarrhea [ ] constipation [ ] pain	  Genitourinary:  [ ] dysuria [ ] frequency [ ] hematuria [ ] discharge [ ] flank pain  [ ] incontinence  Musculoskeletal:  [ ] myalgias [ ] arthralgias [ ] arthritis  [ ] back pain  Neurological:  [ ] headache [ ] weakness [ ] seizures  [ ] confusion/altered mental status    prior hospital charts reviewed [V]  primary team notes reviewed [V]  other consultant notes reviewed [V]    PAST MEDICAL & SURGICAL HISTORY:  Abnormal uterine bleeding          SOCIAL HISTORY:  - denies tobacco or recreational drugs. Reports social alcohol use.    FAMILY HISTORY:      Allergies  No Known Allergies        ANTIMICROBIALS:      ANTIMICROBIALS (past 90 days):  MEDICATIONS  (STANDING):  piperacillin/tazobactam IVPB..   25 mL/Hr IV Intermittent (05-26-22 @ 05:51)   25 mL/Hr IV Intermittent (05-25-22 @ 22:40)   25 mL/Hr IV Intermittent (05-25-22 @ 13:06)   25 mL/Hr IV Intermittent (05-25-22 @ 05:35)   25 mL/Hr IV Intermittent (05-24-22 @ 23:49)    piperacillin/tazobactam IVPB...   200 mL/Hr IV Intermittent (05-24-22 @ 14:22)        OTHER MEDS:   MEDICATIONS  (STANDING):  acetaminophen     Tablet .. 975 every 6 hours  heparin   Injectable 5000 every 8 hours  ibuprofen  Tablet. 600 every 6 hours  oxyCODONE    IR 5 every 4 hours PRN  oxyCODONE    IR 5 every 4 hours PRN  senna 2 at bedtime  simethicone 80 two times a day      VITALS:  Vital Signs Last 24 Hrs  T(F): 98.9 (05-27-22 @ 09:54), Max: 100.3 (05-24-22 @ 12:39)    Vital Signs Last 24 Hrs  HR: 88 (05-27-22 @ 09:54) (88 - 112)  BP: 107/71 (05-27-22 @ 09:54) (107/71 - 136/92)  RR: 18 (05-27-22 @ 09:54)  SpO2: 100% (05-27-22 @ 09:54) (96% - 100%)  Wt(kg): --    EXAM:    GA: NAD, AOx3  HEENT: oral cavity no lesion  CV: nl S1/S2, no RMG  Lungs: CTAB, No distress  Abd: BS+, soft, nontender, no rebounding pain  Ext: no edema  Neuro: No focal deficits  Skin: Intact  IV: no phlebitis    Labs:                        9.2    13.17 )-----------( 601      ( 27 May 2022 05:53 )             30.3     05-27    136  |  102  |  6<L>  ----------------------------<  86  3.7   |  24  |  0.51    Ca    8.7      27 May 2022 05:53  Phos  3.3     05-27  Mg     2.00     05-27        WBC Trend:  WBC Count: 13.17 (05-27-22 @ 05:53)  WBC Count: 16.22 (05-26-22 @ 02:30)  WBC Count: 18.52 (05-25-22 @ 00:45)  WBC Count: 19.54 (05-24-22 @ 22:00)      Auto Neutrophil #: 15.00 K/uL (05-24-22 @ 14:00)      Creatine Trend:  Creatinine, Serum: 0.51 (05-27)  Creatinine, Serum: 0.57 (05-26)  Creatinine, Serum: 0.56 (05-25)  Creatinine, Serum: 0.55 (05-24)      Liver Biochemical Testing Trend:  Alanine Aminotransferase (ALT/SGPT): 10 (05-25)  Alanine Aminotransferase (ALT/SGPT): 9 (05-24)  Alanine Aminotransferase (ALT/SGPT): 10 (05-24)  Aspartate Aminotransferase (AST/SGOT): 9 (05-25-22 @ 00:45)  Aspartate Aminotransferase (AST/SGOT): 13 (05-24-22 @ 22:00)  Aspartate Aminotransferase (AST/SGOT): 11 (05-24-22 @ 14:00)  Bilirubin Total, Serum: 0.8 (05-25)  Bilirubin Total, Serum: 1.3 (05-24)  Bilirubin Total, Serum: 0.3 (05-24)      Trend LDH      Auto Eosinophil %: 0.0 % (05-24-22 @ 14:00)          MICROBIOLOGY:        Culture - Acid Fast - Body Fluid w/Smear (collected 24 May 2022 17:57)  Source: .Body Fluid INTRA ABDOMINAL FLUID    Culture - Fungal, Body Fluid (collected 24 May 2022 17:57)  Source: .Body Fluid INTRA ABDOMINAL FLUID  Preliminary Report:    Testing in progress    Culture - Body Fluid with Gram Stain (collected 24 May 2022 17:57)  Source: .Body Fluid INTRA ABDOMINAL FLUID  Preliminary Report:    No growth    Culture - Urine (collected 24 May 2022 16:08)  Source: Clean Catch Clean Catch (Midstream)  Final Report:    No growth    Culture - Blood (collected 24 May 2022 14:15)  Source: .Blood Blood-Peripheral  Preliminary Report:    No growth to date.    Culture - Blood (collected 24 May 2022 14:00)  Source: .Blood Blood-Peripheral  Preliminary Report:    No growth to date.    D-Dimer Assay, Quantitative: 8219 (05-24)      RADIOLOGY:  imaging below personally reviewed    < from: Xray Chest 1 View- PORTABLE-Routine (Xray Chest 1 View- PORTABLE-Routine in AM.) (05.25.22 @ 01:35) >   Status post intubation and enteric tube placement with clear   lungs and small right effusion.    < end of copied text >      < from: CT Angio Abdomen and Pelvis w/ IV Cont (05.24.22 @ 15:15) >  Bilateral adnexal cysts, likely hemorrhagic on the right. Tubular lesion in the right adnexa measuring 11.0 x 5.7 cm with incomplete septations  consistent with dilated fallopian tube, in keeping with the clinical  history of tubo-ovarian abscess.  Small amount of low attenuating free fluid in the pelvis tracking up to  the upper abdomen. Findings likely represent serosanguineous fluid versus  debris from pelvic inflammatory disease.   < end of copied text >      < from: Xray Chest 1 View- PORTABLE-Urgent (Xray Chest 1 View- PORTABLE-Urgent .) (05.24.22 @ 15:15) >  IMPRESSION: Clear lungs.  < end of copied text >   Patient is a 22y old  Female who presents with a chief complaint of Hemorrhagic cysts, TOA (27 May 2022 07:16)    HPI:  is a 22 year old lady with abdominal pain and fevers since 5/20 and admission to [facility] on 5/22 for treatment of TOA, now presented to [facility] on 5/24 after singing out AMA. In [facility] pt was found to have a TOA and elevated  and was counseled on surgical intervention. Patient was told she may need removal of her reproductive organs and patient left [place] for a second opinion. Also concern for hemorrhagic cysts due to drop in Hgb from 9.6 -> 7.5 while inpatient. Patient denies needing blood transfusion. Here she had low grade fever to 100.3 with leukocytosis to 19k. CT a/p here showed tubular lesion in the right adnexa measuring 11.0 x 5.7 cm with incomplete septations. Pt started on zosyn. Diagnostic laparoscopy performed on 5/24 with dense purulent adhesions of omentum noted to abdominal wall. Decision made to convert to ex-lap due to poor visualization from dense adhesions and limited mobility of pelvic structures. 1L of purulent thick brown fluid evacuated from abdomen. 10cm right adnexal mass with intraop rupture revealing chocolate colored fluid consistent with endometrioma. Right ovary, right fallopian tube, left ovarian cyst, omentum were removed. Patient transferred to SICU post operatively for close hemodynamic monitoring and now stable and transferred to the medical floor on 5/26. ID consulted for abx management. Overnight patient had 4 loose watery bowel movement.       REVIEW OF SYSTEMS  [  ] ROS unobtainable because:    [ x ] All other systems negative except as noted below    Constitutional:  [ ] fever [ ] chills  [ ] weight loss  [ ]night sweat  [ ]poor appetite/PO intake [ ]fatigue   Skin:  [ ] rash [ ] phlebitis	  Eyes: [ ] icterus [ ] pain  [ ] discharge	  ENMT: [ ] sore throat  [ ] thrush [ ] ulcers [ ] exudates [ ]anosmia  Respiratory: [ ] dyspnea [ ] hemoptysis [ ] cough [ ] sputum	  Cardiovascular:  [ ] chest pain [ ] palpitations [ ] edema	  Gastrointestinal:  [ ] nausea [ ] vomiting [ ] diarrhea [ ] constipation [ ] pain	  Genitourinary:  [ ] dysuria [ ] frequency [ ] hematuria [ ] discharge [ ] flank pain  [ ] incontinence  Musculoskeletal:  [ ] myalgias [ ] arthralgias [ ] arthritis  [ ] back pain  Neurological:  [ ] headache [ ] weakness [ ] seizures  [ ] confusion/altered mental status    prior hospital charts reviewed [V]  primary team notes reviewed [V]  other consultant notes reviewed [V]    PAST MEDICAL & SURGICAL HISTORY:  Abnormal uterine bleeding          SOCIAL HISTORY:  - denies tobacco or recreational drugs. Reports social alcohol use.    FAMILY HISTORY: No TOA      Allergies  No Known Allergies        ANTIMICROBIALS:      ANTIMICROBIALS (past 90 days):  MEDICATIONS  (STANDING):  piperacillin/tazobactam IVPB..   25 mL/Hr IV Intermittent (05-26-22 @ 05:51)   25 mL/Hr IV Intermittent (05-25-22 @ 22:40)   25 mL/Hr IV Intermittent (05-25-22 @ 13:06)   25 mL/Hr IV Intermittent (05-25-22 @ 05:35)   25 mL/Hr IV Intermittent (05-24-22 @ 23:49)    piperacillin/tazobactam IVPB...   200 mL/Hr IV Intermittent (05-24-22 @ 14:22)        OTHER MEDS:   MEDICATIONS  (STANDING):  acetaminophen     Tablet .. 975 every 6 hours  heparin   Injectable 5000 every 8 hours  ibuprofen  Tablet. 600 every 6 hours  oxyCODONE    IR 5 every 4 hours PRN  oxyCODONE    IR 5 every 4 hours PRN  senna 2 at bedtime  simethicone 80 two times a day      VITALS:  Vital Signs Last 24 Hrs  T(F): 98.9 (05-27-22 @ 09:54), Max: 100.3 (05-24-22 @ 12:39)    Vital Signs Last 24 Hrs  HR: 88 (05-27-22 @ 09:54) (88 - 112)  BP: 107/71 (05-27-22 @ 09:54) (107/71 - 136/92)  RR: 18 (05-27-22 @ 09:54)  SpO2: 100% (05-27-22 @ 09:54) (96% - 100%)  Wt(kg): --    EXAM:    GA: NAD, AOx3  HEENT: oral cavity no lesion  CV: nl S1/S2, no RMG  Lungs: CTAB, No distress  Abd: BS+, soft, nontender, no rebounding pain  Ext: no edema  Neuro: No focal deficits  Skin: Intact  IV: no phlebitis    Labs:                        9.2    13.17 )-----------( 601      ( 27 May 2022 05:53 )             30.3     05-27    136  |  102  |  6<L>  ----------------------------<  86  3.7   |  24  |  0.51    Ca    8.7      27 May 2022 05:53  Phos  3.3     05-27  Mg     2.00     05-27        WBC Trend:  WBC Count: 13.17 (05-27-22 @ 05:53)  WBC Count: 16.22 (05-26-22 @ 02:30)  WBC Count: 18.52 (05-25-22 @ 00:45)  WBC Count: 19.54 (05-24-22 @ 22:00)      Auto Neutrophil #: 15.00 K/uL (05-24-22 @ 14:00)      Creatine Trend:  Creatinine, Serum: 0.51 (05-27)  Creatinine, Serum: 0.57 (05-26)  Creatinine, Serum: 0.56 (05-25)  Creatinine, Serum: 0.55 (05-24)      Liver Biochemical Testing Trend:  Alanine Aminotransferase (ALT/SGPT): 10 (05-25)  Alanine Aminotransferase (ALT/SGPT): 9 (05-24)  Alanine Aminotransferase (ALT/SGPT): 10 (05-24)  Aspartate Aminotransferase (AST/SGOT): 9 (05-25-22 @ 00:45)  Aspartate Aminotransferase (AST/SGOT): 13 (05-24-22 @ 22:00)  Aspartate Aminotransferase (AST/SGOT): 11 (05-24-22 @ 14:00)  Bilirubin Total, Serum: 0.8 (05-25)  Bilirubin Total, Serum: 1.3 (05-24)  Bilirubin Total, Serum: 0.3 (05-24)      Trend LDH      Auto Eosinophil %: 0.0 % (05-24-22 @ 14:00)          MICROBIOLOGY:        Culture - Acid Fast - Body Fluid w/Smear (collected 24 May 2022 17:57)  Source: .Body Fluid INTRA ABDOMINAL FLUID    Culture - Fungal, Body Fluid (collected 24 May 2022 17:57)  Source: .Body Fluid INTRA ABDOMINAL FLUID  Preliminary Report:    Testing in progress    Culture - Body Fluid with Gram Stain (collected 24 May 2022 17:57)  Source: .Body Fluid INTRA ABDOMINAL FLUID  Preliminary Report:    No growth    Culture - Urine (collected 24 May 2022 16:08)  Source: Clean Catch Clean Catch (Midstream)  Final Report:    No growth    Culture - Blood (collected 24 May 2022 14:15)  Source: .Blood Blood-Peripheral  Preliminary Report:    No growth to date.    Culture - Blood (collected 24 May 2022 14:00)  Source: .Blood Blood-Peripheral  Preliminary Report:    No growth to date.    D-Dimer Assay, Quantitative: 8219 (05-24)      RADIOLOGY:  imaging below personally reviewed    < from: Xray Chest 1 View- PORTABLE-Routine (Xray Chest 1 View- PORTABLE-Routine in AM.) (05.25.22 @ 01:35) >   Status post intubation and enteric tube placement with clear   lungs and small right effusion.    < end of copied text >      < from: CT Angio Abdomen and Pelvis w/ IV Cont (05.24.22 @ 15:15) >  Bilateral adnexal cysts, likely hemorrhagic on the right. Tubular lesion in the right adnexa measuring 11.0 x 5.7 cm with incomplete septations  consistent with dilated fallopian tube, in keeping with the clinical  history of tubo-ovarian abscess.  Small amount of low attenuating free fluid in the pelvis tracking up to  the upper abdomen. Findings likely represent serosanguineous fluid versus  debris from pelvic inflammatory disease.   < end of copied text >      < from: Xray Chest 1 View- PORTABLE-Urgent (Xray Chest 1 View- PORTABLE-Urgent .) (05.24.22 @ 15:15) >  IMPRESSION: Clear lungs.  < end of copied text >   Patient is a 22y old  Female who presents with a chief complaint of Hemorrhagic cysts, TOA (27 May 2022 07:16)    HPI:  is a 22 year old lady with abdominal pain and fevers since 5/20 and admission to [facility] on 5/22 for treatment of TOA, now presented to [facility] on 5/24 after singing out AMA. In [facility] pt was found to have a TOA and elevated  and was counseled on surgical intervention. Patient was told she may need removal of her reproductive organs and patient left [place] for a second opinion. Also concern for hemorrhagic cysts due to drop in Hgb from 9.6 -> 7.5 while inpatient. Patient denies needing blood transfusion. Here she had low grade fever to 100.3 with leukocytosis to 19k. CT a/p here showed tubular lesion in the right adnexa measuring 11.0 x 5.7 cm with incomplete septations. Pt started on zosyn. Diagnostic laparoscopy performed on 5/24 with dense purulent adhesions of omentum noted to abdominal wall. Decision made to convert to ex-lap due to poor visualization from dense adhesions and limited mobility of pelvic structures. 1L of purulent thick brown fluid evacuated from abdomen. 10cm right adnexal mass with intraop rupture revealing chocolate colored fluid consistent with endometrioma. Right ovary, right fallopian tube, left ovarian cyst, omentum were removed. Patient transferred to SICU post operatively for close hemodynamic monitoring and now stable and transferred to the medical floor on 5/26. ID consulted for abx management. Overnight patient had 4 loose watery bowel movement.       REVIEW OF SYSTEMS  [  ] ROS unobtainable because:    [ x ] All other systems negative except as noted below    Constitutional:  [ ] fever [ ] chills  [ ] weight loss  [ ]night sweat  [ ]poor appetite/PO intake [ ]fatigue   Skin:  [ ] rash [ ] phlebitis	  Eyes: [ ] icterus [ ] pain  [ ] discharge	  ENMT: [ ] sore throat  [ ] thrush [ ] ulcers [ ] exudates [ ]anosmia  Respiratory: [ ] dyspnea [ ] hemoptysis [ ] cough [ ] sputum	  Cardiovascular:  [ ] chest pain [ ] palpitations [ ] edema	  Gastrointestinal:  [ ] nausea [ ] vomiting [ ] diarrhea [ ] constipation [ ] pain	  Genitourinary:  [ ] dysuria [ ] frequency [ ] hematuria [ ] discharge [ ] flank pain  [ ] incontinence  Musculoskeletal:  [ ] myalgias [ ] arthralgias [ ] arthritis  [ ] back pain  Neurological:  [ ] headache [ ] weakness [ ] seizures  [ ] confusion/altered mental status    prior hospital charts reviewed [V]  primary team notes reviewed [V]  other consultant notes reviewed [V]    PAST MEDICAL & SURGICAL HISTORY:  Abnormal uterine bleeding          SOCIAL HISTORY:  - denies tobacco or recreational drugs. Reports social alcohol use.    FAMILY HISTORY: No TOA      Allergies  No Known Allergies        ANTIMICROBIALS:      ANTIMICROBIALS (past 90 days):  MEDICATIONS  (STANDING):  piperacillin/tazobactam IVPB..   25 mL/Hr IV Intermittent (05-26-22 @ 05:51)   25 mL/Hr IV Intermittent (05-25-22 @ 22:40)   25 mL/Hr IV Intermittent (05-25-22 @ 13:06)   25 mL/Hr IV Intermittent (05-25-22 @ 05:35)   25 mL/Hr IV Intermittent (05-24-22 @ 23:49)    piperacillin/tazobactam IVPB...   200 mL/Hr IV Intermittent (05-24-22 @ 14:22)        OTHER MEDS:   MEDICATIONS  (STANDING):  acetaminophen     Tablet .. 975 every 6 hours  heparin   Injectable 5000 every 8 hours  ibuprofen  Tablet. 600 every 6 hours  oxyCODONE    IR 5 every 4 hours PRN  oxyCODONE    IR 5 every 4 hours PRN  senna 2 at bedtime  simethicone 80 two times a day      VITALS:  Vital Signs Last 24 Hrs  T(F): 98.9 (05-27-22 @ 09:54), Max: 100.3 (05-24-22 @ 12:39)    Vital Signs Last 24 Hrs  HR: 88 (05-27-22 @ 09:54) (88 - 112)  BP: 107/71 (05-27-22 @ 09:54) (107/71 - 136/92)  RR: 18 (05-27-22 @ 09:54)  SpO2: 100% (05-27-22 @ 09:54) (96% - 100%)  Wt(kg): --    EXAM:    PHYSICAL EXAM:  General:  non-toxic, AOx3  HEAD/EYES: PERRL, white sclera   ENT:  normal, No thrush and no pharyngeal exudate  Neck: Supple  Cardiovascular:   No murmur,  normal S1 and S2  Respiratory: clear to ausculation bilaterally  GI:  soft, MIldline tender aroind midline vertical incision site which is C/D/I, normal bowel sounds  : no chowdhury, no CVA tenderness   Musculoskeletal:  no synovitis  Neurologic: non-focal exam   Skin: no rash  Lymph:  no lymphadenopathy  Psychiatric: Cooperative, appropriate affect, alert & oriented  Lines:  no phlebitis         Labs:                        9.2    13.17 )-----------( 601      ( 27 May 2022 05:53 )             30.3     05-27    136  |  102  |  6<L>  ----------------------------<  86  3.7   |  24  |  0.51    Ca    8.7      27 May 2022 05:53  Phos  3.3     05-27  Mg     2.00     05-27        WBC Trend:  WBC Count: 13.17 (05-27-22 @ 05:53)  WBC Count: 16.22 (05-26-22 @ 02:30)  WBC Count: 18.52 (05-25-22 @ 00:45)  WBC Count: 19.54 (05-24-22 @ 22:00)      Auto Neutrophil #: 15.00 K/uL (05-24-22 @ 14:00)      Creatine Trend:  Creatinine, Serum: 0.51 (05-27)  Creatinine, Serum: 0.57 (05-26)  Creatinine, Serum: 0.56 (05-25)  Creatinine, Serum: 0.55 (05-24)      Liver Biochemical Testing Trend:  Alanine Aminotransferase (ALT/SGPT): 10 (05-25)  Alanine Aminotransferase (ALT/SGPT): 9 (05-24)  Alanine Aminotransferase (ALT/SGPT): 10 (05-24)  Aspartate Aminotransferase (AST/SGOT): 9 (05-25-22 @ 00:45)  Aspartate Aminotransferase (AST/SGOT): 13 (05-24-22 @ 22:00)  Aspartate Aminotransferase (AST/SGOT): 11 (05-24-22 @ 14:00)  Bilirubin Total, Serum: 0.8 (05-25)  Bilirubin Total, Serum: 1.3 (05-24)  Bilirubin Total, Serum: 0.3 (05-24)      Trend LDH      Auto Eosinophil %: 0.0 % (05-24-22 @ 14:00)          MICROBIOLOGY:        Culture - Acid Fast - Body Fluid w/Smear (collected 24 May 2022 17:57)  Source: .Body Fluid INTRA ABDOMINAL FLUID    Culture - Fungal, Body Fluid (collected 24 May 2022 17:57)  Source: .Body Fluid INTRA ABDOMINAL FLUID  Preliminary Report:    Testing in progress    Culture - Body Fluid with Gram Stain (collected 24 May 2022 17:57)  Source: .Body Fluid INTRA ABDOMINAL FLUID  Preliminary Report:    No growth    Culture - Urine (collected 24 May 2022 16:08)  Source: Clean Catch Clean Catch (Midstream)  Final Report:    No growth    Culture - Blood (collected 24 May 2022 14:15)  Source: .Blood Blood-Peripheral  Preliminary Report:    No growth to date.    Culture - Blood (collected 24 May 2022 14:00)  Source: .Blood Blood-Peripheral  Preliminary Report:    No growth to date.    D-Dimer Assay, Quantitative: 8219 (05-24)      RADIOLOGY:  imaging below personally reviewed    < from: Xray Chest 1 View- PORTABLE-Routine (Xray Chest 1 View- PORTABLE-Routine in AM.) (05.25.22 @ 01:35) >   Status post intubation and enteric tube placement with clear   lungs and small right effusion.    < end of copied text >      < from: CT Angio Abdomen and Pelvis w/ IV Cont (05.24.22 @ 15:15) >  Bilateral adnexal cysts, likely hemorrhagic on the right. Tubular lesion in the right adnexa measuring 11.0 x 5.7 cm with incomplete septations  consistent with dilated fallopian tube, in keeping with the clinical  history of tubo-ovarian abscess.  Small amount of low attenuating free fluid in the pelvis tracking up to  the upper abdomen. Findings likely represent serosanguineous fluid versus  debris from pelvic inflammatory disease.   < end of copied text >      < from: Xray Chest 1 View- PORTABLE-Urgent (Xray Chest 1 View- PORTABLE-Urgent .) (05.24.22 @ 15:15) >  IMPRESSION: Clear lungs.  < end of copied text >

## 2022-05-27 NOTE — CONSULT NOTE ADULT - ASSESSMENT
is a 22 year old lady with abdominal pain and fevers since 5/20 and admission to [facility] on 5/22 for treatment of TOA, now presented to [facility] on 5/24 after singing out AMA. Here Patient was taken to the OR for a diagnostic laparoscopy converted to exploratory laparotomy, right salpingo-oophorectomy and open left ovarian cystectomy on 5/24/22.  ID consulted for abx management. Overnight patient had 4 loose watery bowel movement.     WORKUP   Bcx (5/24) - NGTD  Ucx (5/24) - neg   Abdominal fluid cx (5/24): NGTD  GC/CT Negative  CT Angio Abdomen and Pelvis w/ IV Cont (05.24): Bilateral adnexal cysts, likely hemorrhagic on the right. Tubular lesion in the right adnexa measuring 11.0 x 5.7 cm with incomplete septations  consistent with dilated fallopian tube, in keeping with the clinical  history of tubo-ovarian abscess.  Small amount of low attenuating free fluid in the pelvis tracking up to  the upper abdomen.     IMPRESSION  ·	Ruptured Endometrioma with superimposed infection   ·	s/p Ex-Lap Right Salpingo-oophorectomy , Left cystectomy, partial omentectomy, evacuation of 1L of pus (5/25)  ·	Diarrhea    RECOMMENDATIONS  Currently afebrile with resolving leukocytosis to 13k (19k on admission)  On Zosyn (5/24 - )    - C.diff PCR ordered as pt developed watery diarrhea on abx -> Will f/u  - Will f/u final cx data for abdominal fluid.    PT TO BE SEEN. PRELIM NOTE  PENDING RECS. PLEASE WAIT FOR FINAL RECS AFTER DISCUSSION WITH ATTENDINGOsiris Latif MD, PGY4   ID fellow  [employer] Teams Preferred  After 5pm/weekends call [phone]     is a 22 year old lady with abdominal pain and fevers since 5/20 and admission to [facility] on 5/22 for treatment of TOA, now presented to [facility] on 5/24 after singing out AMA. Here Patient was taken to the OR for a diagnostic laparoscopy converted to exploratory laparotomy, right salpingo-oophorectomy and open left ovarian cystectomy on 5/24/22.  ID consulted for abx management. Overnight patient had 4 loose watery bowel movement.     WORKUP   Bcx (5/24) - NGTD  Ucx (5/24) - neg   Abdominal fluid cx (5/24): NGTD  GC/CT Negative  CT Angio Abdomen and Pelvis w/ IV Cont (05.24): Bilateral adnexal cysts, likely hemorrhagic on the right. Tubular lesion in the right adnexa measuring 11.0 x 5.7 cm with incomplete septations  consistent with dilated fallopian tube, in keeping with the clinical  history of tubo-ovarian abscess.  Small amount of low attenuating free fluid in the pelvis tracking up to  the upper abdomen.     IMPRESSION  ·	Ruptured Endometrioma with superimposed infection   ·	s/p Ex-Lap Right Salpingo-oophorectomy , Left cystectomy, partial omentectomy, evacuation of 1L of pus (5/25)  ·	Diarrhea    RECOMMENDATIONS  Currently afebrile with resolving leukocytosis to 13k (19k on admission)  On Zosyn (5/24 - )    - C.diff PCR ordered as pt developed watery diarrhea on abx -> Will f/u, lower suspicion at this time  - Will f/u final cx data for abdominal fluid.  - Recommend Switch to Unasyn 3gm Q6  - Recommend HIV testing    Pt seen and examined. Case d/w attending and primary team    Caleb Latif MD, PGY4   ID fellow  [employer] Teams Preferred  After 5pm/weekends call [phone]

## 2022-05-27 NOTE — CHART NOTE - NSCHARTNOTEFT_GEN_A_CORE
Pt reported 3 loose watery BM on evening of 5/25 and another loose stool in morning of 5/26. Primary OBGYN team & ID team wanted to check for c.diff given loose BMs and Abx.   - obtain stool sample with next stool for cdiff culture    Veena Parkinson, PGY-2

## 2022-05-28 LAB
ANION GAP SERPL CALC-SCNC: 10 MMOL/L — SIGNIFICANT CHANGE UP (ref 7–14)
BASOPHILS # BLD AUTO: 0.02 K/UL — SIGNIFICANT CHANGE UP (ref 0–0.2)
BASOPHILS NFR BLD AUTO: 0.2 % — SIGNIFICANT CHANGE UP (ref 0–2)
BUN SERPL-MCNC: 6 MG/DL — LOW (ref 7–23)
CALCIUM SERPL-MCNC: 8.7 MG/DL — SIGNIFICANT CHANGE UP (ref 8.4–10.5)
CHLORIDE SERPL-SCNC: 102 MMOL/L — SIGNIFICANT CHANGE UP (ref 98–107)
CO2 SERPL-SCNC: 24 MMOL/L — SIGNIFICANT CHANGE UP (ref 22–31)
CREAT SERPL-MCNC: 0.46 MG/DL — LOW (ref 0.5–1.3)
EGFR: 138 ML/MIN/1.73M2 — SIGNIFICANT CHANGE UP
EOSINOPHIL # BLD AUTO: 0.21 K/UL — SIGNIFICANT CHANGE UP (ref 0–0.5)
EOSINOPHIL NFR BLD AUTO: 1.8 % — SIGNIFICANT CHANGE UP (ref 0–6)
GLUCOSE SERPL-MCNC: 100 MG/DL — HIGH (ref 70–99)
HCT VFR BLD CALC: 29.7 % — LOW (ref 34.5–45)
HGB BLD-MCNC: 8.7 G/DL — LOW (ref 11.5–15.5)
IANC: 8.62 K/UL — HIGH (ref 1.8–7.4)
IMM GRANULOCYTES NFR BLD AUTO: 3.7 % — HIGH (ref 0–1.5)
LYMPHOCYTES # BLD AUTO: 1.98 K/UL — SIGNIFICANT CHANGE UP (ref 1–3.3)
LYMPHOCYTES # BLD AUTO: 16.6 % — SIGNIFICANT CHANGE UP (ref 13–44)
MAGNESIUM SERPL-MCNC: 1.9 MG/DL — SIGNIFICANT CHANGE UP (ref 1.6–2.6)
MCHC RBC-ENTMCNC: 21.9 PG — LOW (ref 27–34)
MCHC RBC-ENTMCNC: 29.3 GM/DL — LOW (ref 32–36)
MCV RBC AUTO: 74.6 FL — LOW (ref 80–100)
MONOCYTES # BLD AUTO: 0.65 K/UL — SIGNIFICANT CHANGE UP (ref 0–0.9)
MONOCYTES NFR BLD AUTO: 5.5 % — SIGNIFICANT CHANGE UP (ref 2–14)
NEUTROPHILS # BLD AUTO: 8.62 K/UL — HIGH (ref 1.8–7.4)
NEUTROPHILS NFR BLD AUTO: 72.2 % — SIGNIFICANT CHANGE UP (ref 43–77)
NRBC # BLD: 0 /100 WBCS — SIGNIFICANT CHANGE UP
NRBC # FLD: 0 K/UL — SIGNIFICANT CHANGE UP
PHOSPHATE SERPL-MCNC: 3.9 MG/DL — SIGNIFICANT CHANGE UP (ref 2.5–4.5)
PLATELET # BLD AUTO: 572 K/UL — HIGH (ref 150–400)
POTASSIUM SERPL-MCNC: 3.8 MMOL/L — SIGNIFICANT CHANGE UP (ref 3.5–5.3)
POTASSIUM SERPL-SCNC: 3.8 MMOL/L — SIGNIFICANT CHANGE UP (ref 3.5–5.3)
RBC # BLD: 3.98 M/UL — SIGNIFICANT CHANGE UP (ref 3.8–5.2)
RBC # FLD: 22.3 % — HIGH (ref 10.3–14.5)
SODIUM SERPL-SCNC: 136 MMOL/L — SIGNIFICANT CHANGE UP (ref 135–145)
WBC # BLD: 11.92 K/UL — HIGH (ref 3.8–10.5)
WBC # FLD AUTO: 11.92 K/UL — HIGH (ref 3.8–10.5)

## 2022-05-28 PROCEDURE — 99232 SBSQ HOSP IP/OBS MODERATE 35: CPT

## 2022-05-28 RX ADMIN — OXYCODONE HYDROCHLORIDE 5 MILLIGRAM(S): 5 TABLET ORAL at 10:52

## 2022-05-28 RX ADMIN — Medication 100 MILLIGRAM(S): at 13:05

## 2022-05-28 RX ADMIN — Medication 600 MILLIGRAM(S): at 06:43

## 2022-05-28 RX ADMIN — SIMETHICONE 80 MILLIGRAM(S): 80 TABLET, CHEWABLE ORAL at 18:27

## 2022-05-28 RX ADMIN — AMPICILLIN SODIUM AND SULBACTAM SODIUM 200 GRAM(S): 250; 125 INJECTION, POWDER, FOR SUSPENSION INTRAMUSCULAR; INTRAVENOUS at 18:24

## 2022-05-28 RX ADMIN — AMPICILLIN SODIUM AND SULBACTAM SODIUM 200 GRAM(S): 250; 125 INJECTION, POWDER, FOR SUSPENSION INTRAMUSCULAR; INTRAVENOUS at 06:44

## 2022-05-28 RX ADMIN — Medication 975 MILLIGRAM(S): at 06:43

## 2022-05-28 RX ADMIN — AMPICILLIN SODIUM AND SULBACTAM SODIUM 200 GRAM(S): 250; 125 INJECTION, POWDER, FOR SUSPENSION INTRAMUSCULAR; INTRAVENOUS at 12:26

## 2022-05-28 RX ADMIN — Medication 600 MILLIGRAM(S): at 18:25

## 2022-05-28 RX ADMIN — Medication 975 MILLIGRAM(S): at 12:25

## 2022-05-28 RX ADMIN — Medication 975 MILLIGRAM(S): at 18:25

## 2022-05-28 RX ADMIN — Medication 100 MILLIGRAM(S): at 06:45

## 2022-05-28 RX ADMIN — HEPARIN SODIUM 5000 UNIT(S): 5000 INJECTION INTRAVENOUS; SUBCUTANEOUS at 21:45

## 2022-05-28 RX ADMIN — AMPICILLIN SODIUM AND SULBACTAM SODIUM 200 GRAM(S): 250; 125 INJECTION, POWDER, FOR SUSPENSION INTRAMUSCULAR; INTRAVENOUS at 00:06

## 2022-05-28 RX ADMIN — SIMETHICONE 80 MILLIGRAM(S): 80 TABLET, CHEWABLE ORAL at 06:43

## 2022-05-28 RX ADMIN — HEPARIN SODIUM 5000 UNIT(S): 5000 INJECTION INTRAVENOUS; SUBCUTANEOUS at 06:43

## 2022-05-28 RX ADMIN — Medication 100 MILLIGRAM(S): at 21:45

## 2022-05-28 RX ADMIN — OXYCODONE HYDROCHLORIDE 5 MILLIGRAM(S): 5 TABLET ORAL at 11:45

## 2022-05-28 RX ADMIN — HEPARIN SODIUM 5000 UNIT(S): 5000 INJECTION INTRAVENOUS; SUBCUTANEOUS at 13:05

## 2022-05-28 RX ADMIN — Medication 600 MILLIGRAM(S): at 00:05

## 2022-05-28 RX ADMIN — Medication 975 MILLIGRAM(S): at 00:05

## 2022-05-28 RX ADMIN — Medication 600 MILLIGRAM(S): at 12:25

## 2022-05-28 NOTE — SBIRT NOTE ADULT - NSSBIRTALCPOSREINDET_GEN_A_CORE
SW met w pt who reported social drinking only.  She was educated on the use of alcohol with medications and medical issues.  Pt verbalized understanding.

## 2022-05-28 NOTE — PROGRESS NOTE ADULT - ASSESSMENT
21 yo POD#4 s/p dx laparoscopy converted to ex lap right salpingo-oophorectomy , left cystectomy, partial omentectomy, evacuation of 1L of purulent fluid in setting of ruptured endometrioma with superimposed infection. Patient extubated 5/25 and remains afebrile, overall clinically improved    Neuro  - Tylenol, Motrin, Oxycodone for Pain control  - s/p precedex gtt, Levophed/Propofol.     CV  - Hemodynamically stable; f/u AM Labs  - s/p 2UpRBC +500cc albumin + 2U Cryo intra-op.   - Non-anion gap acidosis (mixed metabolic / respiratory) resolved   - lactate cleared     Pulm:   - s/p ETT (5/24-25), O2 sats wnl on 2L NC, wean off as tolerated   - Incentive spirometry     GI  - Regular Diet, advance diet as tolerated. Pepcid, senna, mylicon   - Loose Stools, CDiff isolation and PCR today, unable to send specimen this AM, will send next specimen      - s/p Harden, voiding spontaneously     Heme  - HSQ, SCDs while in bed  - Encourage OOB to chair, ambulation.     ID   - Afebrile. Downtrending leukocytosis 19.54 ->18.52 ->16.22->11.92  - Continue unasyn(5/27-) for intraabdominal infection. s/p zosyn (5/24-5/27)  - Following ID recs  - Bcx (5/24) - NGTD (p)   - Ucx (5/24) - neg   - f/u abdominal fluid cx (5/24): NGTD  - GC/CT Negative    FEN   - SLIV    April Toledo, PGY-2

## 2022-05-28 NOTE — PROGRESS NOTE ADULT - SUBJECTIVE AND OBJECTIVE BOX
R2 GYN Progress Note    POD#4   HD#5    Patient seen and examined at bedside.  No acute events overnight. No acute complaints.  Pain well controlled. Patient is ambulating and tolerating PO. Had one episide of loose stool this morning. Voiding spontaneously. Denies CP, SOB, N/V, fevers, and chills.    Vital Signs Last 24 Hours  T(C): 37 (05-28-22 @ 06:40), Max: 37.9 (05-27-22 @ 17:49)  HR: 90 (05-28-22 @ 06:40) (86 - 105)  BP: 135/91 (05-28-22 @ 06:40) (107/71 - 140/96)  RR: 18 (05-28-22 @ 06:40) (18 - 20)  SpO2: 95% (05-28-22 @ 06:40) (95% - 100%)    I&O's Summary    27 May 2022 07:01  -  28 May 2022 07:00  --------------------------------------------------------  IN: 980 mL / OUT: 2201 mL / NET: -1221 mL        Physical Exam:  General: NAD  CV: RR, S1, S2  Lungs: CTA b/l  Abdomen: Soft, appropriately-tender to palpation diffusely, softly distended  Incision: Midline vertical incision c/d/i with steri-strips in place  Ext: No pain or swelling, SCD's in place    Labs:                        8.7    11.92 )-----------( 572      ( 28 May 2022 07:41 )             29.7   baso 0.2    eos 1.8    imm gran 3.7    lymph 16.6   mono 5.5    poly 72.2                         9.2    13.17 )-----------( 601      ( 27 May 2022 05:53 )             30.3   baso x      eos x      imm gran x      lymph x      mono x      poly x                            8.5    16.22 )-----------( 572      ( 26 May 2022 02:30 )             28.3   baso x      eos x      imm gran x      lymph x      mono x      poly x          MEDICATIONS  (STANDING):  acetaminophen     Tablet .. 975 milliGRAM(s) Oral every 6 hours  ampicillin/sulbactam  IVPB      ampicillin/sulbactam  IVPB 3 Gram(s) IV Intermittent every 6 hours  benzonatate 100 milliGRAM(s) Oral three times a day  chlorhexidine 2% Cloths 1 Application(s) Topical <User Schedule>  heparin   Injectable 5000 Unit(s) SubCutaneous every 8 hours  ibuprofen  Tablet. 600 milliGRAM(s) Oral every 6 hours  simethicone 80 milliGRAM(s) Chew two times a day    MEDICATIONS  (PRN):  benzocaine 15 mG/menthol 3.6 mG Lozenge 1 Lozenge Oral two times a day PRN Sore Throat  oxyCODONE    IR 5 milliGRAM(s) Oral every 4 hours PRN Moderate Pain (4 - 6)  oxyCODONE    IR 5 milliGRAM(s) Oral every 4 hours PRN Severe Pain (7 - 10)

## 2022-05-28 NOTE — PROGRESS NOTE ADULT - SUBJECTIVE AND OBJECTIVE BOX
Follow Up:      Inverval History/ROS:Patient is a 22y old  Female who presents with a chief complaint of Hemorrhagic cysts, TOA (28 May 2022 08:48)    One soft BM today  One loose BM yesterday  No fever    Allergies    No Known Allergies    Intolerances        ANTIMICROBIALS:  ampicillin/sulbactam  IVPB    ampicillin/sulbactam  IVPB 3 every 6 hours      OTHER MEDS:  acetaminophen     Tablet .. 975 milliGRAM(s) Oral every 6 hours  benzocaine 15 mG/menthol 3.6 mG Lozenge 1 Lozenge Oral two times a day PRN  benzonatate 100 milliGRAM(s) Oral three times a day  chlorhexidine 2% Cloths 1 Application(s) Topical <User Schedule>  heparin   Injectable 5000 Unit(s) SubCutaneous every 8 hours  ibuprofen  Tablet. 600 milliGRAM(s) Oral every 6 hours  oxyCODONE    IR 5 milliGRAM(s) Oral every 4 hours PRN  oxyCODONE    IR 5 milliGRAM(s) Oral every 4 hours PRN  simethicone 80 milliGRAM(s) Chew two times a day      Vital Signs Last 24 Hrs  T(C): 37 (28 May 2022 06:40), Max: 37.9 (27 May 2022 17:49)  T(F): 98.6 (28 May 2022 06:40), Max: 100.3 (27 May 2022 17:49)  HR: 90 (28 May 2022 06:40) (86 - 105)  BP: 135/91 (28 May 2022 06:40) (107/71 - 140/96)  BP(mean): --  RR: 18 (28 May 2022 06:40) (18 - 20)  SpO2: 95% (28 May 2022 06:40) (95% - 100%)    PHYSICAL EXAM:  General: [ x] non-toxic  HEAD/EYES: [ ] PERRL [ ] white sclera [ ] icterus  ENT:  [ ] normal [x ] supple [ ] thrush [ ] pharyngeal exudate  Cardiovascular:   [ ] murmur [ ] normal [ ] PPM/AICD  Respiratory:  [ x] clear to ausculation bilaterally  GI:  [x ] soft, non-tender, normal bowel sounds  :  [ ] chowdhury [ ] no CVA tenderness   Musculoskeletal:  [ ] no synovitis  Neurologic:  [ ] non-focal exam   Skin:  [ ] no rash  Lymph: [x ] no lymphadenopathy  Psychiatric:  [ ] appropriate affect [ ] alert & oriented  Lines:  [x ] no phlebitis [ ] central line                                8.7    11.92 )-----------( 572      ( 28 May 2022 07:41 )             29.7       05-28    136  |  102  |  6<L>  ----------------------------<  100<H>  3.8   |  24  |  0.46<L>    Ca    8.7      28 May 2022 07:41  Phos  3.9     05-28  Mg     1.90     05-28            MICROBIOLOGY:Culture Results:   No growth (05-24-22 @ 17:57)  Culture Results:   Testing in progress (05-24-22 @ 17:57)  Culture Results:   No growth (05-24-22 @ 16:08)  Culture Results:   No growth to date. (05-24-22 @ 14:15)  Culture Results:   No growth to date. (05-24-22 @ 14:00)      RADIOLOGY:

## 2022-05-28 NOTE — PROGRESS NOTE ADULT - ASSESSMENT
22F with ruptured TOA, leukocytosis, diarrhea, s/p exlap  -improving    -dc zosyn    -cx negative so far  -check HIV test  -wbc improving  -change to Augmentin 875 mg po bid in24 hours if no issues  -total 14 days abx postop     - One BM daily: loose yesterday, more formed today  -----C diff sent but clinical syndrome not suggestive of C diff.   -----if c diff positive- start vanco 125 po q 6 and continue for 7 d after augmentin fninishes    Call ID Service with questions

## 2022-05-29 LAB
ANION GAP SERPL CALC-SCNC: 12 MMOL/L — SIGNIFICANT CHANGE UP (ref 7–14)
BUN SERPL-MCNC: 8 MG/DL — SIGNIFICANT CHANGE UP (ref 7–23)
C DIFF BY PCR RESULT: SIGNIFICANT CHANGE UP
C DIFF TOX GENS STL QL NAA+PROBE: SIGNIFICANT CHANGE UP
CALCIUM SERPL-MCNC: 8.8 MG/DL — SIGNIFICANT CHANGE UP (ref 8.4–10.5)
CHLORIDE SERPL-SCNC: 104 MMOL/L — SIGNIFICANT CHANGE UP (ref 98–107)
CO2 SERPL-SCNC: 22 MMOL/L — SIGNIFICANT CHANGE UP (ref 22–31)
CREAT SERPL-MCNC: 0.49 MG/DL — LOW (ref 0.5–1.3)
CULTURE RESULTS: SIGNIFICANT CHANGE UP
EGFR: 136 ML/MIN/1.73M2 — SIGNIFICANT CHANGE UP
GLUCOSE SERPL-MCNC: 97 MG/DL — SIGNIFICANT CHANGE UP (ref 70–99)
HCT VFR BLD CALC: 28.5 % — LOW (ref 34.5–45)
HGB BLD-MCNC: 8.5 G/DL — LOW (ref 11.5–15.5)
MAGNESIUM SERPL-MCNC: 2.1 MG/DL — SIGNIFICANT CHANGE UP (ref 1.6–2.6)
MCHC RBC-ENTMCNC: 21.4 PG — LOW (ref 27–34)
MCHC RBC-ENTMCNC: 29.8 GM/DL — LOW (ref 32–36)
MCV RBC AUTO: 71.8 FL — LOW (ref 80–100)
NRBC # BLD: 0 /100 WBCS — SIGNIFICANT CHANGE UP
NRBC # FLD: 0 K/UL — SIGNIFICANT CHANGE UP
PHOSPHATE SERPL-MCNC: 4 MG/DL — SIGNIFICANT CHANGE UP (ref 2.5–4.5)
PLATELET # BLD AUTO: 611 K/UL — HIGH (ref 150–400)
POTASSIUM SERPL-MCNC: 3.9 MMOL/L — SIGNIFICANT CHANGE UP (ref 3.5–5.3)
POTASSIUM SERPL-SCNC: 3.9 MMOL/L — SIGNIFICANT CHANGE UP (ref 3.5–5.3)
RBC # BLD: 3.97 M/UL — SIGNIFICANT CHANGE UP (ref 3.8–5.2)
RBC # FLD: 22.8 % — HIGH (ref 10.3–14.5)
SODIUM SERPL-SCNC: 138 MMOL/L — SIGNIFICANT CHANGE UP (ref 135–145)
SPECIMEN SOURCE: SIGNIFICANT CHANGE UP
WBC # BLD: 12.04 K/UL — HIGH (ref 3.8–10.5)
WBC # FLD AUTO: 12.04 K/UL — HIGH (ref 3.8–10.5)

## 2022-05-29 RX ADMIN — AMPICILLIN SODIUM AND SULBACTAM SODIUM 200 GRAM(S): 250; 125 INJECTION, POWDER, FOR SUSPENSION INTRAMUSCULAR; INTRAVENOUS at 06:04

## 2022-05-29 RX ADMIN — Medication 600 MILLIGRAM(S): at 00:40

## 2022-05-29 RX ADMIN — AMPICILLIN SODIUM AND SULBACTAM SODIUM 200 GRAM(S): 250; 125 INJECTION, POWDER, FOR SUSPENSION INTRAMUSCULAR; INTRAVENOUS at 00:03

## 2022-05-29 RX ADMIN — SIMETHICONE 80 MILLIGRAM(S): 80 TABLET, CHEWABLE ORAL at 18:05

## 2022-05-29 RX ADMIN — HEPARIN SODIUM 5000 UNIT(S): 5000 INJECTION INTRAVENOUS; SUBCUTANEOUS at 15:17

## 2022-05-29 RX ADMIN — Medication 600 MILLIGRAM(S): at 18:06

## 2022-05-29 RX ADMIN — Medication 975 MILLIGRAM(S): at 00:03

## 2022-05-29 RX ADMIN — Medication 1 TABLET(S): at 18:06

## 2022-05-29 RX ADMIN — HEPARIN SODIUM 5000 UNIT(S): 5000 INJECTION INTRAVENOUS; SUBCUTANEOUS at 06:05

## 2022-05-29 RX ADMIN — Medication 600 MILLIGRAM(S): at 00:03

## 2022-05-29 RX ADMIN — Medication 975 MILLIGRAM(S): at 12:04

## 2022-05-29 RX ADMIN — Medication 100 MILLIGRAM(S): at 15:17

## 2022-05-29 RX ADMIN — Medication 600 MILLIGRAM(S): at 06:36

## 2022-05-29 RX ADMIN — Medication 975 MILLIGRAM(S): at 00:40

## 2022-05-29 RX ADMIN — Medication 600 MILLIGRAM(S): at 12:04

## 2022-05-29 RX ADMIN — Medication 975 MILLIGRAM(S): at 06:05

## 2022-05-29 RX ADMIN — SIMETHICONE 80 MILLIGRAM(S): 80 TABLET, CHEWABLE ORAL at 06:05

## 2022-05-29 RX ADMIN — AMPICILLIN SODIUM AND SULBACTAM SODIUM 200 GRAM(S): 250; 125 INJECTION, POWDER, FOR SUSPENSION INTRAMUSCULAR; INTRAVENOUS at 12:03

## 2022-05-29 RX ADMIN — Medication 975 MILLIGRAM(S): at 18:07

## 2022-05-29 RX ADMIN — Medication 975 MILLIGRAM(S): at 06:36

## 2022-05-29 RX ADMIN — Medication 600 MILLIGRAM(S): at 06:05

## 2022-05-29 RX ADMIN — Medication 100 MILLIGRAM(S): at 06:04

## 2022-05-29 NOTE — PROGRESS NOTE ADULT - SUBJECTIVE AND OBJECTIVE BOX
R2 GYN Progress Note    POD#5   HD#6    Patient seen and examined at bedside.  No acute events overnight. No acute complaints.  Pain well controlled. Patient is ambulating and tolerating PO. Passing flatus. Voiding spontaneously. Harden is still in place. Denies CP, SOB, N/V, fevers, and chills.    Vital Signs Last 24 Hours  T(C): 37.5 (05-29-22 @ 02:00), Max: 37.5 (05-29-22 @ 02:00)  HR: 93 (05-29-22 @ 02:00) (90 - 100)  BP: 127/89 (05-29-22 @ 02:00) (118/79 - 135/91)  RR: 17 (05-29-22 @ 02:00) (16 - 18)  SpO2: 97% (05-29-22 @ 02:00) (95% - 100%)    I&O's Summary    27 May 2022 07:01  -  28 May 2022 07:00  --------------------------------------------------------  IN: 980 mL / OUT: 2201 mL / NET: -1221 mL    28 May 2022 07:01  -  29 May 2022 05:26  --------------------------------------------------------  IN: 1260 mL / OUT: 1400 mL / NET: -140 mL        Physical Exam:  General: NAD  CV: RR, S1, S2  Lungs: CTA b/l  Abdomen: Soft, appropriately-tender to palpation diffusely, softly distended  Incision: Midline vertical incision c/d/i with steri-strips in place  Ext: No pain or swelling, SCD's in place    Labs:                        8.7    11.92 )-----------( 572      ( 28 May 2022 07:41 )             29.7   baso 0.2    eos 1.8    imm gran 3.7    lymph 16.6   mono 5.5    poly 72.2                         9.2    13.17 )-----------( 601      ( 27 May 2022 05:53 )             30.3   baso x      eos x      imm gran x      lymph x      mono x      poly x          MEDICATIONS  (STANDING):  acetaminophen     Tablet .. 975 milliGRAM(s) Oral every 6 hours  ampicillin/sulbactam  IVPB      ampicillin/sulbactam  IVPB 3 Gram(s) IV Intermittent every 6 hours  benzonatate 100 milliGRAM(s) Oral three times a day  heparin   Injectable 5000 Unit(s) SubCutaneous every 8 hours  ibuprofen  Tablet. 600 milliGRAM(s) Oral every 6 hours  simethicone 80 milliGRAM(s) Chew two times a day    MEDICATIONS  (PRN):  benzocaine 15 mG/menthol 3.6 mG Lozenge 1 Lozenge Oral two times a day PRN Sore Throat  oxyCODONE    IR 5 milliGRAM(s) Oral every 4 hours PRN Moderate Pain (4 - 6)  oxyCODONE    IR 5 milliGRAM(s) Oral every 4 hours PRN Severe Pain (7 - 10)

## 2022-05-29 NOTE — PROGRESS NOTE ADULT - ASSESSMENT
23 yo POD#5 s/p dx laparoscopy converted to ex lap right salpingo-oophorectomy , left cystectomy, partial omentectomy, evacuation of 1L of purulent fluid in setting of ruptured endometrioma with superimposed infection. Patient extubated 5/25 and remains afebrile, overall clinically improved    Neuro  - Tylenol, Motrin, Oxycodone for Pain control  - s/p precedex gtt, Levophed/Propofol.     CV  - Hemodynamically stable; f/u AM Labs  - s/p 2UpRBC +500cc albumin + 2U Cryo intra-op.   - Non-anion gap acidosis (mixed metabolic / respiratory) resolved   - lactate cleared     Pulm:   - s/p ETT (5/24-25), O2 sats wnl on 2L NC, wean off as tolerated   - Incentive spirometry     GI  - Regular Diet, . Pepcid, senna, mylicon   - Loose Stools, CDiff isolation and PCR sent, pending      - s/p Harden, voiding spontaneously     Heme  - HSQ, SCDs while in bed  - Encourage OOB to chair, ambulation.     ID   - Afebrile. Downtrending leukocytosis 19.54 ->18.52 ->16.22->11.92  - Continue unasyn(5/27-) for intraabdominal infection. s/p zosyn (5/24-5/27)  - Following ID recs, recommend transitioning to Augmentin for total of 14 days of abx  - Bcx (5/24) - NGTD (p)   - Ucx (5/24) - neg   - f/u abdominal fluid cx (5/24): NGTD  - GC/CT Negative    FEN   - SLIV    Dispo: Continue inpatient treatment     April Toledo, PGY-2

## 2022-05-30 ENCOUNTER — TRANSCRIPTION ENCOUNTER (OUTPATIENT)
Age: 22
End: 2022-05-30

## 2022-05-30 VITALS
HEART RATE: 80 BPM | RESPIRATION RATE: 18 BRPM | TEMPERATURE: 98 F | SYSTOLIC BLOOD PRESSURE: 119 MMHG | OXYGEN SATURATION: 98 % | DIASTOLIC BLOOD PRESSURE: 69 MMHG

## 2022-05-30 RX ORDER — OXYCODONE HYDROCHLORIDE 5 MG/1
1 TABLET ORAL
Qty: 5 | Refills: 0
Start: 2022-05-30

## 2022-05-30 RX ORDER — IBUPROFEN 200 MG
1 TABLET ORAL
Qty: 0 | Refills: 0 | DISCHARGE
Start: 2022-05-30

## 2022-05-30 RX ORDER — ACETAMINOPHEN 500 MG
3 TABLET ORAL
Qty: 0 | Refills: 0 | DISCHARGE
Start: 2022-05-30

## 2022-05-30 RX ADMIN — Medication 975 MILLIGRAM(S): at 02:50

## 2022-05-30 RX ADMIN — Medication 975 MILLIGRAM(S): at 09:34

## 2022-05-30 RX ADMIN — Medication 600 MILLIGRAM(S): at 02:50

## 2022-05-30 RX ADMIN — Medication 975 MILLIGRAM(S): at 10:19

## 2022-05-30 RX ADMIN — Medication 600 MILLIGRAM(S): at 02:20

## 2022-05-30 RX ADMIN — Medication 100 MILLIGRAM(S): at 14:35

## 2022-05-30 RX ADMIN — Medication 975 MILLIGRAM(S): at 02:20

## 2022-05-30 RX ADMIN — Medication 1 TABLET(S): at 05:44

## 2022-05-30 RX ADMIN — Medication 600 MILLIGRAM(S): at 10:19

## 2022-05-30 RX ADMIN — Medication 600 MILLIGRAM(S): at 14:35

## 2022-05-30 RX ADMIN — Medication 100 MILLIGRAM(S): at 10:01

## 2022-05-30 RX ADMIN — Medication 100 MILLIGRAM(S): at 02:21

## 2022-05-30 RX ADMIN — Medication 975 MILLIGRAM(S): at 14:34

## 2022-05-30 RX ADMIN — Medication 600 MILLIGRAM(S): at 09:34

## 2022-05-30 NOTE — PROGRESS NOTE ADULT - NUTRITIONAL ASSESSMENT
This patient has been assessed with a concern for Malnutrition and has been determined to have a diagnosis/diagnoses of Morbid obesity (BMI > 40).    This patient is being managed with:   Diet Regular-  Entered: May 26 2022  8:52AM    

## 2022-05-30 NOTE — PROGRESS NOTE ADULT - SUBJECTIVE AND OBJECTIVE BOX
R2 GYN Progress Note    POD#   HD#    Patient seen and examined at bedside.  No acute events overnight. No acute complaints.  Pain well controlled.  Patient is ambulating and tolerating.....   Has not yet passed flatus vs. Patient is passing flatus.    Patient is voiding spontaneously vs. Harden is still in place.   Denies CP, SOB, N/V, fevers, and chills.    Vital Signs Last 24 Hours  T(C): 36.7 (05-30-22 @ 05:46), Max: 37.2 (05-29-22 @ 18:00)  HR: 78 (05-30-22 @ 05:46) (75 - 94)  BP: 124/79 (05-30-22 @ 05:46) (114/79 - 145/67)  RR: 18 (05-30-22 @ 05:46) (16 - 20)  SpO2: 96% (05-30-22 @ 05:46) (96% - 100%)    I&O's Summary    28 May 2022 07:01  -  29 May 2022 07:00  --------------------------------------------------------  IN: 1480 mL / OUT: 1900 mL / NET: -420 mL    29 May 2022 07:01  -  30 May 2022 06:00  --------------------------------------------------------  IN: 1060 mL / OUT: 1200 mL / NET: -140 mL        Physical Exam:  General: NAD  CV: RR, S1, S2  Lungs: CTA b/l  Abdomen: Soft, appropriately-tender to palpation diffusely, softly distended  Incision: Midline vertical incision c/d/i with steri-strips in place  Ext: No pain or swelling, SCD's in place        Labs:                        8.5    12.04 )-----------( 611      ( 29 May 2022 05:21 )             28.5   baso x      eos x      imm gran x      lymph x      mono x      poly x                            8.7    11.92 )-----------( 572      ( 28 May 2022 07:41 )             29.7   baso 0.2    eos 1.8    imm gran 3.7    lymph 16.6   mono 5.5    poly 72.2       MEDICATIONS  (STANDING):  acetaminophen     Tablet .. 975 milliGRAM(s) Oral every 6 hours  amoxicillin  875 milliGRAM(s)/clavulanate 1 Tablet(s) Oral every 12 hours  benzonatate 100 milliGRAM(s) Oral three times a day  heparin   Injectable 5000 Unit(s) SubCutaneous every 8 hours  ibuprofen  Tablet. 600 milliGRAM(s) Oral every 6 hours  simethicone 80 milliGRAM(s) Chew two times a day    MEDICATIONS  (PRN):  benzocaine 15 mG/menthol 3.6 mG Lozenge 1 Lozenge Oral two times a day PRN Sore Throat  oxyCODONE    IR 5 milliGRAM(s) Oral every 4 hours PRN Moderate Pain (4 - 6)  oxyCODONE    IR 5 milliGRAM(s) Oral every 4 hours PRN Severe Pain (7 - 10)       R2 GYN Progress Note    POD#6   HD#7    Patient seen and examined at bedside.  No acute events overnight. No acute complaints.  Pain well controlled.  Patient is ambulating and tolerating PO  Patient is passing flatus.    Patient is voiding spontaneously   Denies CP, SOB, N/V, fevers, and chills.    Vital Signs Last 24 Hours  T(C): 36.7 (05-30-22 @ 05:46), Max: 37.2 (05-29-22 @ 18:00)  HR: 78 (05-30-22 @ 05:46) (75 - 94)  BP: 124/79 (05-30-22 @ 05:46) (114/79 - 145/67)  RR: 18 (05-30-22 @ 05:46) (16 - 20)  SpO2: 96% (05-30-22 @ 05:46) (96% - 100%)    I&O's Summary    28 May 2022 07:01  -  29 May 2022 07:00  --------------------------------------------------------  IN: 1480 mL / OUT: 1900 mL / NET: -420 mL    29 May 2022 07:01  -  30 May 2022 06:00  --------------------------------------------------------  IN: 1060 mL / OUT: 1200 mL / NET: -140 mL        Physical Exam:  General: NAD  CV: RR, S1, S2  Lungs: CTA b/l  Abdomen: Soft, midly-tender to palpation in bilateral lower quadrants ,  minimally distended  Incision: Midline vertical incision c/d/i with steri-strips in place  Ext: No pain or swelling, SCD's in place        Labs:                        8.5    12.04 )-----------( 611      ( 29 May 2022 05:21 )             28.5   baso x      eos x      imm gran x      lymph x      mono x      poly x                            8.7    11.92 )-----------( 572      ( 28 May 2022 07:41 )             29.7   baso 0.2    eos 1.8    imm gran 3.7    lymph 16.6   mono 5.5    poly 72.2       MEDICATIONS  (STANDING):  acetaminophen     Tablet .. 975 milliGRAM(s) Oral every 6 hours  amoxicillin  875 milliGRAM(s)/clavulanate 1 Tablet(s) Oral every 12 hours  benzonatate 100 milliGRAM(s) Oral three times a day  heparin   Injectable 5000 Unit(s) SubCutaneous every 8 hours  ibuprofen  Tablet. 600 milliGRAM(s) Oral every 6 hours  simethicone 80 milliGRAM(s) Chew two times a day    MEDICATIONS  (PRN):  benzocaine 15 mG/menthol 3.6 mG Lozenge 1 Lozenge Oral two times a day PRN Sore Throat  oxyCODONE    IR 5 milliGRAM(s) Oral every 4 hours PRN Moderate Pain (4 - 6)  oxyCODONE    IR 5 milliGRAM(s) Oral every 4 hours PRN Severe Pain (7 - 10)

## 2022-05-30 NOTE — PROGRESS NOTE ADULT - REASON FOR ADMISSION
Hemorrhagic cysts, TOA
Ruptured Endometrioma
Hemorrhagic cysts, TOA

## 2022-05-30 NOTE — PROGRESS NOTE ADULT - ATTENDING COMMENTS
saw and examined patient   doing better, min flatus but no vomiting  Mildly distended  can advance diet slowly starting with clear liquid diet
saw patient after extubation  Looking and feeling much better  REviewed at length findings at time of surgery  Patient expressed understanding and made aware that will need further discussion about treatment of endometriosis
Pt reports feeling much better  Abd-nontender  Incision -intact  For PO Abx this evening  anticipate d/c home tomorrow
saw patient earlier this AM and was doing well  agree with above assessment
saw patient with resident and agree with above  Patient slowly improving
Patient s/p ex lap for ruptured TOA salpingoophrectomy. Patient weaned off pressors overnight remained on prop and fentanyl  N mentating well, d/c fent pain controlled, start on toradol  resp on minimal vent settings, adequate gas exchange, rsbi 50, plan for extubation  cv hemodynamically stable, perfusing adequately  gi npo, ivf, d/c og tube, consider advancing diet per primary team  gu/renal adequate uop consider d/c chowdhury later this afternoon  heme vte ppx  id on zosyn  endo no changes    The patient is a critical care patient with life threatening hemodynamic and metabolic instability in SICU.  I have personally interviewed when possible and examined the patient, reviewed data and laboratory tests/x-rays and all pertinent electronic images.  I was physically present for the key portions of the evaluation and management (E/M) service provided.   The SICU team has a constant risk benefit analyzes discussion with the primary team, all consultants, House Staff and PA's on all decisions.  These diagnoses are unrelated to the surgical procedure noted above.  I meet with family if needed to get further history, discuss the case and make care decisions for this patient who might not be able to participate.  Time involved in performance of separately billable procedures was not counted toward my critical care time. There is no overlap.  I spent 55-75 minutes ( 0800Hrs-0915Hrs in AM/ 1600Hrs-1715Hrs in PM, or other time indicated) of critical care time for the diagnoses, assessment, plan and interventions.  This time excludes time spent on separate procedures and teaching.
Patient extubated yesterday, s/p ex lap POD2, doing well. Pain control with multimodal pain therapy, advance diet as tolerated, out of bed to chair. Floor eligible, maintain abx.    I have personally interviewed when possible and examined the patient, reviewed data and laboratory tests/x-rays and all pertinent electronic images.  I was physically present for the key portions of the evaluation and management (E/M) service provided.   The SICU team has a constant risk benefit analyzes discussion with the primary team, all consultants, House Staff and PA's on all decisions.  These diagnoses are unrelated to the surgical procedure noted above.  I meet with family if needed to get further history, discuss the case and make care decisions for this patient who might not be able to participate.  Time involved in performance of separately billable procedures was not counted toward my critical care time. There is no overlap.  I spent 30 minutes ( 0800Hrs-0915Hrs in AM/ 1600Hrs-1715Hrs in PM, or other time indicated) of critical care time for the diagnoses, assessment, plan and interventions.  This time excludes time spent on separate procedures and teaching.
Patient seen and examined at bedside, resting comfortably, no acute distress.  Denies fever, chills, chest pain, SOB, nausea or vomiting. Pain is well controlled.   Tolerating a regular diet. Ambulating without difficulty. Voiding spontaneously, passing flatus and reports bowel movement.  Abdomen soft, appropriately tender. Vertical midline incision clean, dry and intact, surrounding skin non erythematous.  Patient refusing AM labs this morning. She remains afebrile with stable vital signs. Leukocytosis overall downtrending.  She will continue PO augmentin for a total of 14 days. She will then follow up in ACU in 2 weeks.  Stable for discharge home.    Josue Licea MD

## 2022-05-30 NOTE — DISCHARGE NOTE NURSING/CASE MANAGEMENT/SOCIAL WORK - NSDCPNINST_GEN_ALL_CORE
Notify MD if experiencing fever, nausea, vomiting, increased pain, bleeding, drainage or odor from incision.

## 2022-05-30 NOTE — DISCHARGE NOTE NURSING/CASE MANAGEMENT/SOCIAL WORK - NSDCPEFALRISK_GEN_ALL_CORE
For information on Fall & Injury Prevention, visit: [email or web address] OR  [email or web address] OR  https://www.cdc.gov/steadi/patient.html

## 2022-05-30 NOTE — DISCHARGE NOTE NURSING/CASE MANAGEMENT/SOCIAL WORK - HISTORY OF COVID-19 VACCINATION
Yes Nsaids Counseling: NSAID Counseling: I discussed with the patient that NSAIDs should be taken with food. Prolonged use of NSAIDs can result in the development of stomach ulcers.  Patient advised to stop taking NSAIDs if abdominal pain occurs.  The patient verbalized understanding of the proper use and possible adverse effects of NSAIDs.  All of the patient's questions and concerns were addressed.

## 2022-05-30 NOTE — DISCHARGE NOTE NURSING/CASE MANAGEMENT/SOCIAL WORK - PATIENT PORTAL LINK FT
You can access the FollowMyHealth Patient Portal offered by [facility] by registering at the following website: [email or web address]. By joining [facility]’s FollowMyHealth portal, you will also be able to view your health information using other applications (apps) compatible with our system.

## 2022-05-30 NOTE — PROGRESS NOTE ADULT - ASSESSMENT
23 yo POD#5 s/p dx laparoscopy converted to ex lap right salpingo-oophorectomy , left cystectomy, partial omentectomy, evacuation of 1L of purulent fluid in setting of ruptured endometrioma with superimposed infection. Patient extubated 5/25 and remains afebrile, overall clinically improved    Neuro  - Tylenol, Motrin, Oxycodone for Pain control  - s/p precedex gtt, Levophed/Propofol.     CV  - Hemodynamically stable; f/u AM Labs  - s/p 2UpRBC +500cc albumin + 2U Cryo intra-op.   - Non-anion gap acidosis (mixed metabolic / respiratory) resolved   - lactate cleared     Pulm:   - s/p ETT (5/24-25), O2 sats wnl on 2L NC, wean off as tolerated   - Incentive spirometry     GI  - Regular Diet, . Pepcid, senna, mylicon   - Loose Stools, CDiff isolation and PCR sent, pending      - s/p Harden, voiding spontaneously     Heme  - HSQ, SCDs while in bed  - Encourage OOB to chair, ambulation.     ID   - Afebrile. Downtrending leukocytosis 19.54 ->18.52 ->16.22->11.92  - Continue unasyn(5/27-) for intraabdominal infection. s/p zosyn (5/24-5/27)  - Following ID recs, recommend transitioning to Augmentin for total of 14 days of abx  - Bcx (5/24) - NGTD (p)   - Ucx (5/24) - neg   - f/u abdominal fluid cx (5/24): NGTD  - GC/CT Negative    FEN   - SLIV    Dispo: Continue inpatient treatment     Keri Huggins, PGY2 23 yo POD#5 s/p dx laparoscopy converted to ex lap right salpingo-oophorectomy , left cystectomy, partial omentectomy, evacuation of 1L of purulent fluid in setting of ruptured endometrioma with superimposed infection. Patient extubated 5/25 and remains afebrile, overall clinically improved    Neuro  - Tylenol, Motrin, Oxycodone for Pain control      CV  - Hemodynamically stable; f/u AM Labs( patient currently refusing)  - s/p 2UpRBC +500cc albumin + 2U Cryo intra-op.   - lactate cleared     Pulm:   - s/p ETT (5/24-25), O2 sats wnl on 2L NC, wean off as tolerated   - Incentive spirometry     GI  - Regular Diet, . Pepcid, senna, mylicon   - Loose Stools, CDiff isolation and PCR sent, pending      - s/p Harden, voiding spontaneously     Heme  - HSQ, SCDs while in bed  - Encourage OOB to chair, ambulation.     ID   - Afebrile. Downtrending leukocytosis 19.54 ->18.52 ->16.22->11.92 ( patient currently refusing labs)  - Continue Augmentin (5/28-) for intraabdominal infection. s/p zosyn (5/24-5/27),  unasyn(5/27-5/28)  - Following ID recs, recommend transitioning to Augmentin for total of 14 days of abx( stop preeti 6/11)  - Bcx (5/24) - NGTD (p)   - Ucx (5/24) - neg   - f/u abdominal fluid cx (5/24): NGTD  - GC/CT Negative    FEN   - SLIV    Dispo: Continue inpatient treatment, discharge planning     Keri Huggins, PGY2

## 2022-05-31 PROBLEM — N93.9 ABNORMAL UTERINE AND VAGINAL BLEEDING, UNSPECIFIED: Chronic | Status: ACTIVE | Noted: 2022-05-24

## 2022-06-02 LAB — SURGICAL PATHOLOGY STUDY: SIGNIFICANT CHANGE UP

## 2022-06-08 ENCOUNTER — RESULT REVIEW (OUTPATIENT)
Age: 22
End: 2022-06-08

## 2022-06-08 ENCOUNTER — OUTPATIENT (OUTPATIENT)
Dept: OUTPATIENT SERVICES | Facility: HOSPITAL | Age: 22
LOS: 1 days | End: 2022-06-08

## 2022-06-08 ENCOUNTER — APPOINTMENT (OUTPATIENT)
Dept: OBGYN | Facility: HOSPITAL | Age: 22
End: 2022-06-08

## 2022-06-08 VITALS
SYSTOLIC BLOOD PRESSURE: 114 MMHG | WEIGHT: 195 LBS | HEART RATE: 100 BPM | TEMPERATURE: 97.8 F | DIASTOLIC BLOOD PRESSURE: 78 MMHG | HEIGHT: 62 IN | BODY MASS INDEX: 35.88 KG/M2

## 2022-06-08 PROCEDURE — 99024 POSTOP FOLLOW-UP VISIT: CPT | Mod: GC

## 2022-06-09 DIAGNOSIS — T81.31XA DISRUPTION OF EXTERNAL OPERATION (SURGICAL) WOUND, NOT ELSEWHERE CLASSIFIED, INITIAL ENCOUNTER: ICD-10-CM

## 2022-06-10 LAB
-  AMIKACIN: SIGNIFICANT CHANGE UP
-  AZTREONAM: SIGNIFICANT CHANGE UP
-  CEFEPIME: SIGNIFICANT CHANGE UP
-  CEFTAZIDIME: SIGNIFICANT CHANGE UP
-  CIPROFLOXACIN: SIGNIFICANT CHANGE UP
-  GENTAMICIN: SIGNIFICANT CHANGE UP
-  IMIPENEM: SIGNIFICANT CHANGE UP
-  LEVOFLOXACIN: SIGNIFICANT CHANGE UP
-  MEROPENEM: SIGNIFICANT CHANGE UP
-  PIPERACILLIN/TAZOBACTAM: SIGNIFICANT CHANGE UP
-  TOBRAMYCIN: SIGNIFICANT CHANGE UP
CULTURE RESULTS: SIGNIFICANT CHANGE UP
METHOD TYPE: SIGNIFICANT CHANGE UP
ORGANISM # SPEC MICROSCOPIC CNT: SIGNIFICANT CHANGE UP
ORGANISM # SPEC MICROSCOPIC CNT: SIGNIFICANT CHANGE UP
SPECIMEN SOURCE: SIGNIFICANT CHANGE UP

## 2022-06-15 ENCOUNTER — APPOINTMENT (OUTPATIENT)
Dept: OBGYN | Facility: HOSPITAL | Age: 22
End: 2022-06-15

## 2022-06-15 ENCOUNTER — RESULT REVIEW (OUTPATIENT)
Age: 22
End: 2022-06-15

## 2022-06-15 ENCOUNTER — OUTPATIENT (OUTPATIENT)
Dept: OUTPATIENT SERVICES | Facility: HOSPITAL | Age: 22
LOS: 1 days | End: 2022-06-15

## 2022-06-15 VITALS
SYSTOLIC BLOOD PRESSURE: 100 MMHG | HEIGHT: 62 IN | DIASTOLIC BLOOD PRESSURE: 47 MMHG | HEART RATE: 98 BPM | WEIGHT: 199 LBS | TEMPERATURE: 97.3 F | BODY MASS INDEX: 36.62 KG/M2

## 2022-06-15 DIAGNOSIS — Z72.89 OTHER PROBLEMS RELATED TO LIFESTYLE: ICD-10-CM

## 2022-06-15 PROCEDURE — 99212 OFFICE O/P EST SF 10 MIN: CPT | Mod: GC

## 2022-06-17 ENCOUNTER — NON-APPOINTMENT (OUTPATIENT)
Age: 22
End: 2022-06-17

## 2022-06-18 LAB
-  AMIKACIN: SIGNIFICANT CHANGE UP
-  AZTREONAM: SIGNIFICANT CHANGE UP
-  CEFEPIME: SIGNIFICANT CHANGE UP
-  CEFTAZIDIME: SIGNIFICANT CHANGE UP
-  CIPROFLOXACIN: SIGNIFICANT CHANGE UP
-  GENTAMICIN: SIGNIFICANT CHANGE UP
-  IMIPENEM: SIGNIFICANT CHANGE UP
-  LEVOFLOXACIN: SIGNIFICANT CHANGE UP
-  MEROPENEM: SIGNIFICANT CHANGE UP
-  PIPERACILLIN/TAZOBACTAM: SIGNIFICANT CHANGE UP
-  TOBRAMYCIN: SIGNIFICANT CHANGE UP
METHOD TYPE: SIGNIFICANT CHANGE UP

## 2022-06-20 RX ORDER — CIPROFLOXACIN HYDROCHLORIDE 500 MG/1
500 TABLET, FILM COATED ORAL
Qty: 10 | Refills: 0 | Status: ACTIVE | COMMUNITY
Start: 2022-06-20 | End: 1900-01-01

## 2022-06-21 LAB
CULTURE RESULTS: SIGNIFICANT CHANGE UP
ORGANISM # SPEC MICROSCOPIC CNT: SIGNIFICANT CHANGE UP
ORGANISM # SPEC MICROSCOPIC CNT: SIGNIFICANT CHANGE UP
SPECIMEN SOURCE: SIGNIFICANT CHANGE UP

## 2022-06-22 ENCOUNTER — OUTPATIENT (OUTPATIENT)
Dept: OUTPATIENT SERVICES | Facility: HOSPITAL | Age: 22
LOS: 1 days | End: 2022-06-22

## 2022-06-22 ENCOUNTER — APPOINTMENT (OUTPATIENT)
Dept: OBGYN | Facility: HOSPITAL | Age: 22
End: 2022-06-22

## 2022-06-22 VITALS
BODY MASS INDEX: 36.44 KG/M2 | SYSTOLIC BLOOD PRESSURE: 108 MMHG | WEIGHT: 198 LBS | TEMPERATURE: 97.9 F | HEART RATE: 79 BPM | DIASTOLIC BLOOD PRESSURE: 63 MMHG | HEIGHT: 62 IN

## 2022-06-22 DIAGNOSIS — Z98.890 OTHER SPECIFIED POSTPROCEDURAL STATES: ICD-10-CM

## 2022-06-22 DIAGNOSIS — T14.8XXA OTHER INJURY OF UNSPECIFIED BODY REGION, INITIAL ENCOUNTER: ICD-10-CM

## 2022-06-22 LAB
CULTURE RESULTS: SIGNIFICANT CHANGE UP
SPECIMEN SOURCE: SIGNIFICANT CHANGE UP

## 2022-06-22 PROCEDURE — 99213 OFFICE O/P EST LOW 20 MIN: CPT | Mod: GC

## 2022-06-22 NOTE — HISTORY OF PRESENT ILLNESS
[Pain is well-controlled] : pain is well-controlled [Pelvic Pressure] : pelvic pressure [Dehiscence] : dehisced [Discharge] : noted to have a ~M discharge [Moderate] : moderate [Clear] : clear [Watery] : watery [Serosanguineous] : serosanguineous [Pathology reviewed] : pathology reviewed [Fever] : no fever [Chills] : no chills [Nausea] : no nausea [Vomiting] : no vomiting [Vaginal Bleeding] : no vaginal bleeding [Dysuria] : no dysuria [Vaginal Discharge] : no vaginal discharge [Erythema] : not erythematous [Swelling] : not swollen [Foul Smelling] : not foul smelling [de-identified] : 23 y/o G0, LMP 5/1, is now POD#15 s/p a Dx LSC with conversion to ex-lap, RSO, and left ovarian cystectomy for a suspected TOA. The operative note was reviewed. Intra-operatively, the patient was noted to have dense omental adhesions to the abdominal wall, 1L of brown fluid was evacuated which was concerning for purulence, and a ruptured endometrioma. The procedure was converted to laparotomy due to the adhesions. The patient reports leaking of yellow-tinged, nonmalodorous fluid from her incision x1 week. She was seen at [facility] this past Tuesday for this complaint and was told no interventions are required and that the incision is healing normally. She denies pus at the incision, fever/chills, n/v. She denies redness and hardness around the incision. She has mild pain in the abdomen describes as soreness, for which she takes 1-2 tabs of ibuprofen daily. She is tolerating oral intake as usual and ambulating normally. Denies abnormal vaginal discharge.\par \par She is currently taking Augmentin which she was discharged with following the surgery.  [de-identified] : Soft, non-tender abdomen. An umbilical port site incision that extended slightly above the umbilicus and was contiguous with midline vertical was visualized and epidermal edges were noted to be , dermis intact, no drainage appreciated from the site. A midline vertical skin incision is appreciated, noted to have an area of dehiscence at the inferior edge about 3 cm in length; the dehiscence extends from the level of the skin through the subcutaneous layer. The area was probed and the fascia is noted to be intact. Depth of separation was roughly 3 cm, and it tracked superiorly about 1 cm. Remainder of incision closure was intact. Sero-sanguinous drainage noted from this area, and some necrotic tissue, but no purulence. Wound culture was collected. No active bleeding noted. Sutures in the open area of the incision were removed. The rest of the incision was healing appropriately. No underlying collections palpable. THE WOUND WAS PACKED WITH 1/4 INCH IODOFORM PACKING FOR THE DEEPER MORE NARROW PORTION OF THE INCISION AND A WET TO DRY DRESSING WAS PLACED OVER TOP OF THIS.

## 2022-06-22 NOTE — ASSESSMENT
[Excellent Pain Control] : has excellent pain control [No Sign of Infection] : is showing no signs of infection [de-identified] : Wound dehiscence

## 2022-06-23 DIAGNOSIS — T81.9XXA UNSPECIFIED COMPLICATION OF PROCEDURE, INITIAL ENCOUNTER: ICD-10-CM

## 2022-06-24 ENCOUNTER — NON-APPOINTMENT (OUTPATIENT)
Age: 22
End: 2022-06-24

## 2022-06-29 ENCOUNTER — APPOINTMENT (OUTPATIENT)
Dept: OBGYN | Facility: HOSPITAL | Age: 22
End: 2022-06-29

## 2022-06-29 ENCOUNTER — APPOINTMENT (OUTPATIENT)
Dept: WOUND CARE | Facility: CLINIC | Age: 22
End: 2022-06-29
Payer: MEDICAID

## 2022-06-29 VITALS
TEMPERATURE: 97 F | WEIGHT: 198 LBS | HEIGHT: 62 IN | SYSTOLIC BLOOD PRESSURE: 117 MMHG | RESPIRATION RATE: 16 BRPM | BODY MASS INDEX: 36.44 KG/M2 | DIASTOLIC BLOOD PRESSURE: 78 MMHG | HEART RATE: 74 BPM

## 2022-06-29 PROCEDURE — 99205 OFFICE O/P NEW HI 60 MIN: CPT

## 2022-06-29 NOTE — HISTORY OF PRESENT ILLNESS
[FreeTextEntry1] : 21 y/o G0 s/p Dx LSC with conversion to ex-lap, RSO, and left ovarian cystectomy (5/25) for TOA with postoperative course c/b superficial wound opening requiring packing on home wound care. Last seen on 6/15 and was found to have a superficial <0.5cm opening at the superficial aspect of her midline vertical incision, as well as a 4x4x2.5cm inferior superficial separation along the incision. Patient has been packing the inferior defect with wet-to-dry dressing. VNS changes her dressing q2 days, and her mother assists her in changing her dressing on the days in which VNS is not present. Patient notes some watery/yellow discharge from the inferior defect, but it never saturates the overlaying dressing. She also denies incisional bleeding, avis-incisional redness, or abdominal pain. She denies fevers, chills, chest pain, SOB, n/v, abnormal vaginal bleeding or vaginal discharge, urinary or bowel complaints.

## 2022-06-29 NOTE — HISTORY OF PRESENT ILLNESS
[FreeTextEntry1] : 21 y/o G0, LMP 5/1, s/p Dx LSC with conversion to ex-lap, RSO, and left ovarian cystectomy (5/25) for TOA with postoperative course c/b superficial wound opening requiring packing on home wound care. Last seen on 6/8 where opening was noted and home care initiated. Since, patient's mother changing wound packing daily. Home nursing saw her Sunday and Monday and noted good wound healing. Pt has some yellow discharge, but no purulence, erythema or pain. \par \par Overall patient doing well. Minimal pain. Mild nausea, through no emesis. Does endorse new migraines. Denies fevers, chills, chest pain, SOB, pain or swelling in LE. Good appetite, having regular BMs. Taking ibuprofen PRN for headache. Coping with emotional trauma from extended hospital stay. \par \par \par

## 2022-06-29 NOTE — ASSESSMENT
[FreeTextEntry1] : 6-29-22:\par Wound Assessment and Plan: \par The patient presents with a wound to the lower abdomen s/p lap converted to open GYN surgical procedure\par Wound has been managed by GYN.\par Nurse comes 3x/week. Mother changes dressing on weekends\par On exam: \par Straight depth 1.5cm, no undermining.\par Wound clean with granulation tissue.  No s/s of infection\par \par

## 2022-06-29 NOTE — PLAN
[FreeTextEntry1] : 21 y/o G0, LMP 5/1, s/p Dx LSC with conversion to ex-lap, RSO, and left ovarian cystectomy (5/25) for TOA with postoperative course c/b superficial wound opening requiring packing on home wound care. No evidence of infection at this time given pt with VSS, no fevers, chills, significant abdominal pain and well healing opening on exam. \par \par #Superficial wound separation\par - Incision probed and fascia intact\par - Copious irrigation of opening performed with repacking (wet to dry, 1" plain) and dressing \par - Pt given materials for home\par - Will increase home VNS to daily with plan for q12h dressing changes (evening done by mother). Form submitted today. \par - Wound care appointment made for patient - 6/29 at 1:30 pm ([address])\par \par #Postoperative state\par - Healing well with minimall pain. Advised continued activity restriction until 6 weeks post op. Advised no drinking or smoking due to impairment of wound healing. \par \par RTC in 1 week for wound check \par Seen with Dr Michel

## 2022-06-29 NOTE — PHYSICAL EXAM
[Appropriately responsive] : appropriately responsive [Alert] : alert [No Acute Distress] : no acute distress [Regular Rate Rhythm] : regular rate rhythm [No Murmurs] : no murmurs [Clear to Auscultation B/L] : clear to auscultation bilaterally [Soft] : soft [Non-tender] : non-tender [Non-distended] : non-distended [No HSM] : No HSM [Oriented x3] : oriented x3 [FreeTextEntry7] : Midline vertical incision with superficial opening at inferior aspect (4x4x2.5cm), probed with fasica intact, scant serosanguineous fluid, tissue with pink with areas of granulation tissue, well healing. Small (<0.5cm) supericial opening at superior aspect on incision with no significant drainage noted or expressed. No evidence of infection.

## 2022-06-29 NOTE — HISTORY OF PRESENT ILLNESS
[FreeTextEntry1] : Ms. ANABEL STUBBS   presents to the office with a wound for 4 weeks duration.  The wound is located on  the abdomen.  The patient has complaints of drainage.   The patient has been dressing the wound with aquacel and tegaderm.  The patient denies fevers or chills.  The patient has localized pain to the wound upon dressing changes.  The patient has no other complaints or associated symptoms. \par  - - -

## 2022-06-29 NOTE — PHYSICAL EXAM
[Alert] : alert [Oriented to Person] : oriented to person [Oriented to Place] : oriented to place [Oriented to Time] : oriented to time [Calm] : calm [Please See PDF for [employer] Analytics] : Please See PDF for [employer]. [Abdomen Tenderness] : ~T ~M No abdominal tenderness [de-identified] : NCAT [de-identified] : EOMI [de-identified] : supple [de-identified] : equal chest rise [de-identified] : soft, NT, midline scar with opening distally [de-identified] : midline abdominal wound

## 2022-06-29 NOTE — PHYSICAL EXAM
[Appropriately responsive] : appropriately responsive [Alert] : alert [No Acute Distress] : no acute distress [No Lymphadenopathy] : no lymphadenopathy [Regular Rate Rhythm] : regular rate rhythm [No Murmurs] : no murmurs [Clear to Auscultation B/L] : clear to auscultation bilaterally [Soft] : soft [Non-tender] : non-tender [Non-distended] : non-distended [No Lesions] : no lesions [No Mass] : no mass [Oriented x3] : oriented x3 [FreeTextEntry7] : midline vertical skin incision from pubic symphysis to umbilicus, incision c/d/i with the exception of a [id number] superficial wound separation. Packing in place, and removed, with viable pink granulation tissue noted in the wound bed. No surrounding erythema, collection, or induration. No incisional discharge or bleeding. Previously noted superior superficial wound defect appears c/d/i and well-healed.

## 2022-06-29 NOTE — PLAN
[FreeTextEntry1] : 6/29/2022:\par Plan:\par Will wash with soap and water. Continue with Aquacel packing/Adhesive foam. \par Nursing orders given\par Pt instructed on wound care as well.\par telehealth instructions given\par f/u in 2 weeks

## 2022-06-29 NOTE — DISCUSSION/SUMMARY
[FreeTextEntry1] : MIGS FELLOW ADDENDUM\par I have seen the patient, read the above note, and agree with the resident's assessment and plan with the following additions/exceptions: \par \par Ms. Sam is a 21yo s/p (5/24) dLs coverted to exlap, RSO, L ovarian cystectomy, ADENIKE in the setting of superinfected endometrioma and sepsis. Operative report with ?possible Stage IV endometriosis vs adhesive disease 2/2 infection. Path cw endometrioma with inflammation. Her postop course c/b superficial wound dehiscence for which she is doing daily wet-to-dry dressing changes. Otherwise recovering well. Completed course of augmentin. AF, VS wnl, obese female with overall well healed midline vertical infra-umbilical skin incision with superficial wound separation at the most inferior aspect where the panus folds over. \par - 4cm L x 2cm H x 4cm D defect that tapers from a 4cm W to a 1cm W at 4cm depth\par - Healthy granulation tissue, pink and well perfused, rectus fascia intact; culture taken\par - Cleansed/flushed with normal saline and repacked with 1' plain packing, covered with abdominal pad\par \par # Superficial wound separation\par - f/u wound culture\par - BID wet-to-dry dressing changes\par - F/u 1 week for wound check, Appt made for wound care clinic as well to assess for possible wound vac placement given location of separation underneath panus\par - ED precautions reviewed\par \par \par #Endometriosis\par - monitor pain and cycles\par - pt obese, recommend progesterone only options for management. We discussed data supporting hormonal management of endometriosis after endometrioma resection given rate of recurrence. All questions answered. Consider adjuvant therapy\par \par \par CWChan, PGY6\par

## 2022-06-30 ENCOUNTER — NON-APPOINTMENT (OUTPATIENT)
Age: 22
End: 2022-06-30

## 2022-07-06 ENCOUNTER — APPOINTMENT (OUTPATIENT)
Dept: OBGYN | Facility: HOSPITAL | Age: 22
End: 2022-07-06

## 2022-07-13 ENCOUNTER — APPOINTMENT (OUTPATIENT)
Dept: WOUND CARE | Facility: CLINIC | Age: 22
End: 2022-07-13

## 2022-07-13 LAB
CULTURE RESULTS: SIGNIFICANT CHANGE UP
SPECIMEN SOURCE: SIGNIFICANT CHANGE UP

## 2022-07-13 PROCEDURE — 99214 OFFICE O/P EST MOD 30 MIN: CPT

## 2022-07-13 NOTE — PHYSICAL EXAM
[Alert] : alert [Oriented to Person] : oriented to person [Oriented to Place] : oriented to place [Oriented to Time] : oriented to time [Calm] : calm [Please See PDF for [employer] Analytics] : Please See PDF for [employer]. [Abdomen Tenderness] : ~T ~M No abdominal tenderness [de-identified] : NCAT [de-identified] : EOMI [de-identified] : supple [de-identified] : equal chest rise [de-identified] : soft, NT, midline scar with opening distally [de-identified] : midline abdominal wound

## 2022-07-13 NOTE — PLAN
[FreeTextEntry1] : 6/29/2022:\par Plan:\par Will wash with soap and water. Continue with Aquacel packing/Adhesive foam. \par Nursing orders given\par Pt instructed on wound care as well.\par telehealth instructions given\par f/u in 2 weeks \par \par 7-13-22:\par Plan:\par wash with soap and water. Continue with Aquacel packing/Adhesive foam\par F/u 2 weeks\par \par

## 2022-07-13 NOTE — ASSESSMENT
[FreeTextEntry1] : 6-29-22:\par Wound Assessment and Plan: \par The patient presents with a wound to the lower abdomen s/p lap converted to open GYN surgical procedure\par Wound has been managed by GYN.\par Nurse comes 3x/week. Mother changes dressing on weekends\par On exam: \par Straight depth 1.5cm, no undermining.\par Wound clean with granulation tissue.  No s/s of infection\par \par 7/13/22\par Pt here for f/u.  Pt w/o complaints.\par Home care nurse 2x/week.  Pt changing dressing every other day on her own.\par On exam:\par wound clean.  depth 0.5cm.  \par No s/s of infection\par

## 2022-07-13 NOTE — HISTORY OF PRESENT ILLNESS
[FreeTextEntry1] : Ms. ANABEL STUBBS   presents to the office with a wound for 4 weeks duration.  The wound is located on  the abdomen.  The patient has complaints of drainage.   The patient has been dressing the wound with aquacel and tegaderm.  The patient denies fevers or chills.  The patient has localized pain to the wound upon dressing changes.  The patient has no other complaints or associated symptoms. \par

## 2022-07-27 ENCOUNTER — APPOINTMENT (OUTPATIENT)
Dept: WOUND CARE | Facility: CLINIC | Age: 22
End: 2022-07-27

## 2022-07-27 VITALS — TEMPERATURE: 98.2 F

## 2022-07-27 PROCEDURE — 99213 OFFICE O/P EST LOW 20 MIN: CPT

## 2022-07-27 NOTE — PHYSICAL EXAM
[Abdomen Tenderness] : ~T ~M No abdominal tenderness [Alert] : alert [Oriented to Person] : oriented to person [Oriented to Place] : oriented to place [Oriented to Time] : oriented to time [Calm] : calm [de-identified] : NCAT [de-identified] : EOMI [de-identified] : supple [de-identified] : equal chest rise [de-identified] : soft, NT, midline scar with opening distally [de-identified] : midline abdominal wound [Please See PDF for [employer] Analytics] : Please See PDF for [employer].

## 2022-07-27 NOTE — ASSESSMENT
[FreeTextEntry1] : 6-29-22:\par Wound Assessment and Plan: \par The patient presents with a wound to the lower abdomen s/p lap converted to open GYN surgical procedure\par Wound has been managed by GYN.\par Nurse comes 3x/week. Mother changes dressing on weekends\par On exam: \par Straight depth 1.5cm, no undermining.\par Wound clean with granulation tissue.  No s/s of infection\par \par 7/13/22\par Pt here for f/u.  Pt w/o complaints.\par Home care nurse 2x/week.  Pt changing dressing every other day on her own.\par On exam:\par wound clean.  depth 0.5cm.  \par No s/s of infection\par \par \par 7-27-22:\par Pt here for f/u.  Pt w/o complaints.\par Pt doing the dressing herself.\par No longer has home care nurse\par On exam:\par wound clean. depth 0.3cm\par No s/s of infection

## 2022-07-27 NOTE — PLAN
[FreeTextEntry1] : 6/29/2022:\par Plan:\par Will wash with soap and water. Continue with Aquacel packing/Adhesive foam. \par Nursing orders given\par Pt instructed on wound care as well.\par telehealth instructions given\par f/u in 2 weeks \par \par 7-13-22:\par Plan:\par wash with soap and water. Continue with Aquacel packing/Adhesive foam\par F/u 2 weeks\par \par 7-27-22:\par Plan:\par wash with soap and water. Continue with Aquacel packing/Adhesive foam\par F/u 2 weeks

## 2022-08-10 ENCOUNTER — APPOINTMENT (OUTPATIENT)
Dept: WOUND CARE | Facility: CLINIC | Age: 22
End: 2022-08-10

## 2022-08-10 VITALS — TEMPERATURE: 97.7 F | HEIGHT: 62 IN | BODY MASS INDEX: 36.44 KG/M2 | WEIGHT: 198 LBS

## 2022-08-10 DIAGNOSIS — T81.31XA DISRUPTION OF EXTERNAL OPERATION (SURGICAL) WOUND, NOT ELSEWHERE CLASSIFIED, INITIAL ENCOUNTER: ICD-10-CM

## 2022-08-10 DIAGNOSIS — T14.90XA INJURY, UNSPECIFIED, INITIAL ENCOUNTER: ICD-10-CM

## 2022-08-10 PROCEDURE — 99213 OFFICE O/P EST LOW 20 MIN: CPT

## 2022-08-27 PROBLEM — T14.90XA CLOSED WOUND: Status: ACTIVE | Noted: 2022-08-27

## 2022-08-27 NOTE — PHYSICAL EXAM
[Alert] : alert [Oriented to Person] : oriented to person [Oriented to Place] : oriented to place [Oriented to Time] : oriented to time [Calm] : calm [Please See PDF for Tissue Analytics] : Please See PDF for [employer]. [Abdomen Masses] : No abdominal massess [de-identified] : Wound completely healed, see above in 'Abdomen.' [Abdomen Tenderness] : ~T ~M No abdominal tenderness [de-identified] : NCAT [de-identified] : EOMI [de-identified] : supple [de-identified] : equal chest rise [de-identified] : soft, NT, midline scar with opening distally [de-identified] : midline abdominal wound

## 2022-08-27 NOTE — REASON FOR VISIT
[Consultation] : a consultation visit [Follow-Up: _____] : a [unfilled] follow-up visit [FreeTextEntry1] : abdomen

## 2022-08-27 NOTE — PHYSICAL EXAM
[Alert] : alert [Oriented to Person] : oriented to person [Oriented to Place] : oriented to place [Oriented to Time] : oriented to time [Calm] : calm [Please See PDF for Tissue Analytics] : Please See PDF for [employer]. [Abdomen Masses] : No abdominal massess [de-identified] : Wound completely healed, see above in 'Abdomen.' [Abdomen Tenderness] : ~T ~M No abdominal tenderness [de-identified] : NCAT [de-identified] : EOMI [de-identified] : supple [de-identified] : equal chest rise [de-identified] : soft, NT, midline scar with opening distally [de-identified] : midline abdominal wound

## 2022-08-27 NOTE — ASSESSMENT
[Verbal] : Verbal [Patient] : Patient [Good - alert, interested, motivated] : Good - alert, interested, motivated [Demonstrates independently] : demonstrates independently [Skin Care] : skin care [] : Yes [Stable] : stable [Home] : Home [Ambulatory] : Ambulatory [FreeTextEntry1] : 6-29-22:\par Wound Assessment and Plan: \par The patient presents with a wound to the lower abdomen s/p lap converted to open GYN surgical procedure\par Wound has been managed by GYN.\par Nurse comes 3x/week. Mother changes dressing on weekends\par On exam: \par Straight depth 1.5cm, no undermining.\par Wound clean with granulation tissue.  No s/s of infection\par \par 7/13/22\par Pt here for f/u.  Pt w/o complaints.\par Home care nurse 2x/week.  Pt changing dressing every other day on her own.\par On exam:\par wound clean.  depth 0.5cm.  \par No s/s of infection\par \par \par 7-27-22:\par Pt here for f/u.  Pt w/o complaints.\par Pt doing the dressing herself.\par No longer has home care nurse\par On exam:\par wound clean. depth 0.3cm\par No s/s of infection\par \par 8/10/22\par Wound closed

## 2022-09-07 ENCOUNTER — OUTPATIENT (OUTPATIENT)
Dept: OUTPATIENT SERVICES | Facility: HOSPITAL | Age: 22
LOS: 1 days | End: 2022-09-07

## 2022-09-07 ENCOUNTER — APPOINTMENT (OUTPATIENT)
Dept: OBGYN | Facility: HOSPITAL | Age: 22
End: 2022-09-07

## 2022-09-07 VITALS
HEIGHT: 59 IN | WEIGHT: 202 LBS | HEART RATE: 74 BPM | DIASTOLIC BLOOD PRESSURE: 85 MMHG | SYSTOLIC BLOOD PRESSURE: 135 MMHG | BODY MASS INDEX: 40.72 KG/M2 | TEMPERATURE: 97.9 F

## 2022-09-07 PROCEDURE — 99212 OFFICE O/P EST SF 10 MIN: CPT | Mod: GC

## 2022-09-09 DIAGNOSIS — T81.30XA DISRUPTION OF WOUND, UNSPECIFIED, INITIAL ENCOUNTER: ICD-10-CM

## 2024-04-17 ENCOUNTER — APPOINTMENT (OUTPATIENT)
Dept: OBGYN | Facility: HOSPITAL | Age: 24
End: 2024-04-17
Payer: MEDICAID

## 2024-04-17 ENCOUNTER — OUTPATIENT (OUTPATIENT)
Dept: OUTPATIENT SERVICES | Facility: HOSPITAL | Age: 24
LOS: 1 days | End: 2024-04-17

## 2024-04-17 ENCOUNTER — INPATIENT (INPATIENT)
Facility: HOSPITAL | Age: 24
LOS: 1 days | Discharge: HOME | End: 2024-04-19
Attending: STUDENT IN AN ORGANIZED HEALTH CARE EDUCATION/TRAINING PROGRAM | Admitting: STUDENT IN AN ORGANIZED HEALTH CARE EDUCATION/TRAINING PROGRAM
Payer: MEDICAID

## 2024-04-17 ENCOUNTER — RESULT REVIEW (OUTPATIENT)
Age: 24
End: 2024-04-17

## 2024-04-17 VITALS
HEART RATE: 95 BPM | RESPIRATION RATE: 18 BRPM | DIASTOLIC BLOOD PRESSURE: 89 MMHG | OXYGEN SATURATION: 100 % | SYSTOLIC BLOOD PRESSURE: 130 MMHG | TEMPERATURE: 98 F | WEIGHT: 201.06 LBS

## 2024-04-17 VITALS
HEIGHT: 59 IN | WEIGHT: 200 LBS | SYSTOLIC BLOOD PRESSURE: 108 MMHG | DIASTOLIC BLOOD PRESSURE: 70 MMHG | BODY MASS INDEX: 40.32 KG/M2 | TEMPERATURE: 98 F | HEART RATE: 100 BPM

## 2024-04-17 DIAGNOSIS — R10.2 PELVIC AND PERINEAL PAIN: ICD-10-CM

## 2024-04-17 DIAGNOSIS — Z90.721 ACQUIRED ABSENCE OF OVARIES, UNILATERAL: Chronic | ICD-10-CM

## 2024-04-17 LAB
BASE EXCESS BLDV CALC-SCNC: -1.3 MMOL/L — SIGNIFICANT CHANGE UP (ref -2–3)
BASOPHILS # BLD AUTO: 0.03 K/UL — SIGNIFICANT CHANGE UP (ref 0–0.2)
BASOPHILS NFR BLD AUTO: 0.2 % — SIGNIFICANT CHANGE UP (ref 0–2)
BLOOD GAS VENOUS COMPREHENSIVE RESULT: SIGNIFICANT CHANGE UP
CHLORIDE BLDV-SCNC: 104 MMOL/L — SIGNIFICANT CHANGE UP (ref 96–108)
CO2 BLDV-SCNC: 26.7 MMOL/L — HIGH (ref 22–26)
EOSINOPHIL # BLD AUTO: 0.69 K/UL — HIGH (ref 0–0.5)
EOSINOPHIL NFR BLD AUTO: 5.1 % — SIGNIFICANT CHANGE UP (ref 0–6)
GAS PNL BLDV: 136 MMOL/L — SIGNIFICANT CHANGE UP (ref 136–145)
GLUCOSE BLDV-MCNC: 91 MG/DL — SIGNIFICANT CHANGE UP (ref 70–99)
HCO3 BLDV-SCNC: 25 MMOL/L — SIGNIFICANT CHANGE UP (ref 22–29)
HCT VFR BLD CALC: 36.2 % — SIGNIFICANT CHANGE UP (ref 34.5–45)
HCT VFR BLDA CALC: 35 % — SIGNIFICANT CHANGE UP (ref 34.5–46.5)
HGB BLD CALC-MCNC: 11.8 G/DL — SIGNIFICANT CHANGE UP (ref 11.7–16.1)
HGB BLD-MCNC: 11.5 G/DL — SIGNIFICANT CHANGE UP (ref 11.5–15.5)
IANC: 9.58 K/UL — HIGH (ref 1.8–7.4)
IMM GRANULOCYTES NFR BLD AUTO: 0.4 % — SIGNIFICANT CHANGE UP (ref 0–0.9)
LACTATE BLDV-MCNC: 1.2 MMOL/L — SIGNIFICANT CHANGE UP (ref 0.5–2)
LYMPHOCYTES # BLD AUTO: 17.2 % — SIGNIFICANT CHANGE UP (ref 13–44)
LYMPHOCYTES # BLD AUTO: 2.31 K/UL — SIGNIFICANT CHANGE UP (ref 1–3.3)
MCHC RBC-ENTMCNC: 26.3 PG — LOW (ref 27–34)
MCHC RBC-ENTMCNC: 31.8 GM/DL — LOW (ref 32–36)
MCV RBC AUTO: 82.6 FL — SIGNIFICANT CHANGE UP (ref 80–100)
MONOCYTES # BLD AUTO: 0.76 K/UL — SIGNIFICANT CHANGE UP (ref 0–0.9)
MONOCYTES NFR BLD AUTO: 5.7 % — SIGNIFICANT CHANGE UP (ref 2–14)
NEUTROPHILS # BLD AUTO: 9.58 K/UL — HIGH (ref 1.8–7.4)
NEUTROPHILS NFR BLD AUTO: 71.4 % — SIGNIFICANT CHANGE UP (ref 43–77)
NRBC # BLD: 0 /100 WBCS — SIGNIFICANT CHANGE UP (ref 0–0)
NRBC # FLD: 0 K/UL — SIGNIFICANT CHANGE UP (ref 0–0)
PCO2 BLDV: 49 MMHG — SIGNIFICANT CHANGE UP (ref 39–52)
PH BLDV: 7.32 — SIGNIFICANT CHANGE UP (ref 7.32–7.43)
PLATELET # BLD AUTO: 477 K/UL — HIGH (ref 150–400)
PO2 BLDV: 27 MMHG — SIGNIFICANT CHANGE UP (ref 25–45)
POTASSIUM BLDV-SCNC: 3.6 MMOL/L — SIGNIFICANT CHANGE UP (ref 3.5–5.1)
RBC # BLD: 4.38 M/UL — SIGNIFICANT CHANGE UP (ref 3.8–5.2)
RBC # FLD: 14.3 % — SIGNIFICANT CHANGE UP (ref 10.3–14.5)
SAO2 % BLDV: 34.1 % — LOW (ref 67–88)
WBC # BLD: 13.43 K/UL — HIGH (ref 3.8–10.5)
WBC # FLD AUTO: 13.43 K/UL — HIGH (ref 3.8–10.5)

## 2024-04-17 PROCEDURE — 99213 OFFICE O/P EST LOW 20 MIN: CPT | Mod: GE

## 2024-04-17 PROCEDURE — 99285 EMERGENCY DEPT VISIT HI MDM: CPT

## 2024-04-17 PROCEDURE — 76830 TRANSVAGINAL US NON-OB: CPT | Mod: 26

## 2024-04-17 RX ORDER — SODIUM CHLORIDE 9 MG/ML
1000 INJECTION INTRAMUSCULAR; INTRAVENOUS; SUBCUTANEOUS ONCE
Refills: 0 | Status: COMPLETED | OUTPATIENT
Start: 2024-04-17 | End: 2024-04-17

## 2024-04-17 RX ADMIN — SODIUM CHLORIDE 1000 MILLILITER(S): 9 INJECTION INTRAMUSCULAR; INTRAVENOUS; SUBCUTANEOUS at 23:04

## 2024-04-17 NOTE — ED ADULT TRIAGE NOTE - CHIEF COMPLAINT QUOTE
Patient complains of abdominal pain and rectal pain since Sunday. Patient also complains of constipation since Saturday. Patient denies any nausea, vomiting, diarrhea at this time. Patient denies any chest pain or SOB, respirations equal and unlabored on room air. past medical history: endometriosis.

## 2024-04-17 NOTE — ED PROVIDER NOTE - OBJECTIVE STATEMENT
22 y/o F with pmhx endometriosis, PID/TOA s/p right salpingo-oophorectomy in 2022 who presents to the ED for abd pain, rectal pain and constipation since Saturday. Pt reports last BM was on this afternoon, however reports was harder to pass and was small in caliber. Pt states pain does not feel similar to her endometrial pain, feels similar to constipation pain that she had last year (that resolved after taking senna). Pt has been taking senna without any relief. Pt also notes diffuse abd bloating and rectal pain, worse when making a BM. Pt was at [facility] outpatient GYN clinic earlier today for a second opinion on a medication Myfembree for management of endometriosis. While at clinic, pt mentioned having abd pain and was recommended to come to the ED for further evaluation given significant gyn hx. Denies fever/chills, N/V, melena or hematochezia, blood when she wipes, dysuria, vaginal discharge, chest pain, shortness of breath.  Past surgical hx includes ex-lap with right salpingo-oophorectomy. Pt also with hx of TOA/PID in 10/2023 and in 3/2024 with admission to [facility]. Pt GYN: Dr. Candace Rizvi. LNMP "end of march" per patient.

## 2024-04-17 NOTE — ED PROVIDER NOTE - CARE PLAN
Principal Discharge DX:	Abdominal pain   1 Principal Discharge DX:	Pelvic inflammatory disease (PID)

## 2024-04-17 NOTE — ED PROVIDER NOTE - PROGRESS NOTE DETAILS
NANDA Rey: Patient signed out to me pending US result and ob/gyn consult. US shows a heterogeneous fibroid uterus with several cystic uterine lesions likely representing cystic degeneration of fibroids. This can be confirmed with nonemergent pelvic MRI. Status post right salpingo-oophorectomy with a right adnexal septated cystic lesion. Differential includes peritoneal inclusion cyst, endometrioma, or postsurgical collection. This would be better evaluated with pelvic MRI.  Ob/gyn requested admission under their service to Dr. Licea for further management of presumed PID. Will be treating with ceftriaxone, doxy, and flagyl, as well as toradol for pain and miralax for constipation.

## 2024-04-17 NOTE — ED PROVIDER NOTE - ATTENDING APP SHARED VISIT CONTRIBUTION OF CARE
nikki: This is a 23-year-old woman who comes in with history of endometriosis PID and TOA who had a right ovary removal in 2022 comes in for abdominal pain from the GYN clinic rectal pain and constipation.  There is a high level of concern for the possibility of either PID or TOA again.  GYN is in the emergency department seeing the patient as well.    Patient is awake and alert able to communicate.  Blood pressure 104/53 respiratory rate 18 heart rate 81 afebrile O2 sat normal.    Abdomen upper belly is soft no rebound no guarding however low belly is significantly tender both left and right.    Patient with ultrasound that shows concern for on right septated fluid collection 5 x 3 x 6 and on the left 3 x 3 x 3 is normal    With GYN input patient plans to be admitted and treated for PID in the hospital    I performed a history and physical exam of the patient and discussed their management with the resident and /or advanced care provider. I personally made/approved the management plan and take responsibility for the patient management. I reviewed the resident and /or ACP's note and agree with the documented findings and plan of care. My medical decison making and observations are found above. nikki: This is a 23-year-old woman who comes in with history of endometriosis PID and TOA who had a right ovary removal in 2022 comes in for abdominal pain from the GYN clinic rectal pain and constipation.  There is a high level of concern for the possibility of either PID or TOA again.  GYN is in the emergency department seeing the patient as well.    Patient is awake and alert able to communicate.  Blood pressure 104/53 respiratory rate 18 heart rate 81 afebrile O2 sat normal.    Abdomen upper belly is soft no rebound no guarding however low belly is significantly tender both left and right.    Patient with ultrasound that shows concern for on right septated fluid collection 5 x 3 x 6 and on the left 3 x 3 x 3 is normal    With GYN input patient plans to be admitted and treated for PID in the hospital.    I performed a history and physical exam of the patient and discussed their management with the resident and /or advanced care provider. I personally made/approved the management plan and take responsibility for the patient management. I reviewed the resident and /or ACP's note and agree with the documented findings and plan of care. My medical decision making and observations are found above.

## 2024-04-17 NOTE — ED PROVIDER NOTE - CLINICAL SUMMARY MEDICAL DECISION MAKING FREE TEXT BOX
Patient currently afebrile, hemodynamically stable, spO2 100%. Based on history and physical, differentials include but are not limited to PID vs ruptured ovarian cysts vs constipation. Pt without adnexal tenderness to palpation, lower suspicion for TOA or torsion at this time. Low suspicion for complete SBO as pt with passage of flatus and BM earlier today. Plan to assess patient for acute pathology as listed above with labs, TVUS. Will administer medications for symptomatic relief, follow-up on results, and reassess. Patient currently afebrile, hemodynamically stable, spO2 100%. Based on history and physical, differentials include but are not limited to PID vs ruptured ovarian cysts vs constipation. Pt without adnexal tenderness to palpation, lower suspicion for TOA or torsion at this time. Low suspicion for complete SBO as pt with passage of flatus and BM earlier today. Plan to assess patient for acute pathology as listed above with labs, TVUS. Will administer medications for symptomatic relief, follow-up on results, and reassess.    nikki: This is a 23-year-old woman who comes in with history of endometriosis PID and TOA who had a right ovary removal in 2022 comes in for abdominal pain from the GYN clinic rectal pain and constipation.  There is a high level of concern for the possibility of either PID or TOA again.  GYN is in the emergency department seeing the patient as well.    Patient is awake and alert able to communicate.  Blood pressure 104/53 respiratory rate 18 heart rate 81 afebrile O2 sat normal.    Abdomen upper belly is soft no rebound no guarding however low belly is significantly tender both left and right.    Patient with ultrasound that shows concern for on right septated fluid collection 5 x 3 x 6 and on the left 3 x 3 x 3 is normal    With GYN input patient plans to be admitted and treated for PID in the hospital

## 2024-04-17 NOTE — CONSULT NOTE ADULT - SUBJECTIVE AND OBJECTIVE BOX
ANABEL STUBBS  23y  Female [id number]    HPI: 23y G0 LMP "end of March" who presented to the clinic earlier today for a second opinion regarding her endometriosis who was sent to the ED for further evaluation given severe pain in the setting of her significant GN hx. Patient reports having diffuse abdominal pain since Sunday without any appreciable alleviating or exacerbating factors. Says that her last sufficient BM was on Monday and that she is unsure of when the last time she passed flatus. States that this "feels like constipation," and this different from her endometriosis pain which is moreso akin to "glass." Denies fevers, chills, nausea, vomiting, dysuria, vaginal discharge, chest pain, shortness of breath, diarrhea.     GYN hx:   5/25/2022: Dx LSC with dense adhesions requiring conversion to ex-lap, RSO, and left ovarian cystectomy, ADENIKE, omentectomy with assistance of Gyn Oncology (5/25/22) for TOA with postoperative course c/b superficial wound opening requiring home wound packing  10/2023: Admitted to [facility] for TOA? vs. infected endometrioma? Reports being given IV abx and undergoing drainage  3/2024: Admitted to [facility] for TOA? vs. infected endometrioma? Denies being drained during this admission and took her abx thereafter     Name of Ob/Gyn Physician: Dr. Candace Rizvi (in the [facility]), Dr. DAILY" at [facility] GYN clinic most recently for second opinion     POB: G0   PGyn: Above, Endometriosis  - Sexually active with men (last encounter 4-5/2023) and women (3/2024)  MedHx: Eczema  SurgHx: Above   Meds: Ibuprofen, Acetaminophen   Allergies: Contrast (hives)  Social: Denies any tobacco use, drug use, or alcohol use     Vital Signs Last 24 Hrs  T(C): 36.8 (17 Apr 2024 19:33), Max: 36.8 (17 Apr 2024 19:33)  T(F): 98.2 (17 Apr 2024 19:33), Max: 98.2 (17 Apr 2024 19:33)  HR: 95 (17 Apr 2024 19:33) (95 - 95)  BP: 130/89 (17 Apr 2024 19:33) (130/89 - 130/89)  BP(mean): --  RR: 18 (17 Apr 2024 19:33) (18 - 18)  SpO2: 100% (17 Apr 2024 19:33) (100% - 100%)    Parameters below as of 17 Apr 2024 19:33  Patient On (Oxygen Delivery Method): room air        Physical Exam:   General: Awake. Alert. Mild distress   Lungs: Unlabored breathing. No respiratory distress   Abd: Soft. Distended. Diffusely tender. No guarding. No pain elicited with jostling of stretcher     (deferred): Prior exam by GYN resident in the clinic noted CMT and tenderness to palpation of the uterus   Ext: No calf tenderness bilaterally  Skin: Diffuse eczema noted     LABS:                I&O's Detail          RADIOLOGY & ADDITIONAL STUDIES:

## 2024-04-17 NOTE — ED ADULT NURSE NOTE - OBJECTIVE STATEMENT
23 year old AO4 ambulatory female c/o generalized abd pain for 3 days. Denies PMHx. States last BM was yesterday, able to pass gas. Denies bloody stool. Denies urinary symptoms. Left 20g IV placed, labs drawn, fluids infusing as per eMAR. Pending imaging.

## 2024-04-17 NOTE — ED PROVIDER NOTE - PHYSICAL EXAMINATION
General: Well appearing in no acute distress, alert and cooperative, A&Ox3  Eyes: PERRLA, no conjunctival injection, no scleral icterus  Neck: Soft and supple  Cardiac: Regular rate and regular rhythm, no murmurs  Resp: Unlabored respiratory effort, lungs CTAB, speaking in full sentences, no wheezes  Abd: +tenderness to palpation over suprapubic region and LLQ. Abd is soft, non-distended, no guarding or rebound tenderness. No CVA tenderness b/l  : Chaperoned by PAUL Willard. External genitalia appear normal without any lesions or erythema. Speculum exam with scant white discharge, cervix appears normal without erythema or lesions. +CMT on manual exam. No adnexal tenderness to palpation. No palpable masses.   Rectal: Chaperoned by PAUL Willard. No masses or anal fissures noted externally. No palpable masses. No gross blood in rectal vault.   Skin: Warm and dry, no rashes/abrasions/lacerations

## 2024-04-18 DIAGNOSIS — R10.2 PELVIC AND PERINEAL PAIN: ICD-10-CM

## 2024-04-18 DIAGNOSIS — N73.9 FEMALE PELVIC INFLAMMATORY DISEASE, UNSPECIFIED: ICD-10-CM

## 2024-04-18 DIAGNOSIS — Z01.419 ENCOUNTER FOR GYNECOLOGICAL EXAMINATION (GENERAL) (ROUTINE) WITHOUT ABNORMAL FINDINGS: ICD-10-CM

## 2024-04-18 LAB
ALBUMIN SERPL ELPH-MCNC: 3.9 G/DL — SIGNIFICANT CHANGE UP (ref 3.3–5)
ALP SERPL-CCNC: 57 U/L — SIGNIFICANT CHANGE UP (ref 40–120)
ALT FLD-CCNC: 12 U/L — SIGNIFICANT CHANGE UP (ref 4–33)
ANION GAP SERPL CALC-SCNC: 12 MMOL/L — SIGNIFICANT CHANGE UP (ref 7–14)
ANION GAP SERPL CALC-SCNC: 14 MMOL/L — SIGNIFICANT CHANGE UP (ref 7–14)
APPEARANCE UR: CLEAR — SIGNIFICANT CHANGE UP
AST SERPL-CCNC: 16 U/L — SIGNIFICANT CHANGE UP (ref 4–32)
BASOPHILS # BLD AUTO: 0.02 K/UL — SIGNIFICANT CHANGE UP (ref 0–0.2)
BASOPHILS NFR BLD AUTO: 0.2 % — SIGNIFICANT CHANGE UP (ref 0–2)
BILIRUB SERPL-MCNC: <0.2 MG/DL — SIGNIFICANT CHANGE UP (ref 0.2–1.2)
BILIRUB UR-MCNC: NEGATIVE — SIGNIFICANT CHANGE UP
BLD GP AB SCN SERPL QL: NEGATIVE — SIGNIFICANT CHANGE UP
BUN SERPL-MCNC: 10 MG/DL — SIGNIFICANT CHANGE UP (ref 7–23)
BUN SERPL-MCNC: 9 MG/DL — SIGNIFICANT CHANGE UP (ref 7–23)
C TRACH RRNA SPEC QL NAA+PROBE: SIGNIFICANT CHANGE UP
CALCIUM SERPL-MCNC: 7.8 MG/DL — LOW (ref 8.4–10.5)
CALCIUM SERPL-MCNC: 8.8 MG/DL — SIGNIFICANT CHANGE UP (ref 8.4–10.5)
CANDIDA AB TITR SER: SIGNIFICANT CHANGE UP
CHLORIDE SERPL-SCNC: 104 MMOL/L — SIGNIFICANT CHANGE UP (ref 98–107)
CHLORIDE SERPL-SCNC: 108 MMOL/L — HIGH (ref 98–107)
CO2 SERPL-SCNC: 19 MMOL/L — LOW (ref 22–31)
CO2 SERPL-SCNC: 20 MMOL/L — LOW (ref 22–31)
COLOR SPEC: YELLOW — SIGNIFICANT CHANGE UP
CREAT SERPL-MCNC: 0.54 MG/DL — SIGNIFICANT CHANGE UP (ref 0.5–1.3)
CREAT SERPL-MCNC: 0.57 MG/DL — SIGNIFICANT CHANGE UP (ref 0.5–1.3)
DIFF PNL FLD: NEGATIVE — SIGNIFICANT CHANGE UP
EGFR: 130 ML/MIN/1.73M2 — SIGNIFICANT CHANGE UP
EGFR: 132 ML/MIN/1.73M2 — SIGNIFICANT CHANGE UP
EOSINOPHIL # BLD AUTO: 0.69 K/UL — HIGH (ref 0–0.5)
EOSINOPHIL NFR BLD AUTO: 6 % — SIGNIFICANT CHANGE UP (ref 0–6)
G VAGINALIS DNA SPEC QL NAA+PROBE: SIGNIFICANT CHANGE UP
GLUCOSE SERPL-MCNC: 89 MG/DL — SIGNIFICANT CHANGE UP (ref 70–99)
GLUCOSE SERPL-MCNC: 95 MG/DL — SIGNIFICANT CHANGE UP (ref 70–99)
GLUCOSE UR QL: NEGATIVE MG/DL — SIGNIFICANT CHANGE UP
HCT VFR BLD CALC: 33.3 % — LOW (ref 34.5–45)
HGB BLD-MCNC: 10.5 G/DL — LOW (ref 11.5–15.5)
IANC: 6.94 K/UL — SIGNIFICANT CHANGE UP (ref 1.8–7.4)
IMM GRANULOCYTES NFR BLD AUTO: 0.3 % — SIGNIFICANT CHANGE UP (ref 0–0.9)
KETONES UR-MCNC: ABNORMAL MG/DL
LEUKOCYTE ESTERASE UR-ACNC: NEGATIVE — SIGNIFICANT CHANGE UP
LYMPHOCYTES # BLD AUTO: 27.8 % — SIGNIFICANT CHANGE UP (ref 13–44)
LYMPHOCYTES # BLD AUTO: 3.2 K/UL — SIGNIFICANT CHANGE UP (ref 1–3.3)
MCHC RBC-ENTMCNC: 26.4 PG — LOW (ref 27–34)
MCHC RBC-ENTMCNC: 31.5 GM/DL — LOW (ref 32–36)
MCV RBC AUTO: 83.7 FL — SIGNIFICANT CHANGE UP (ref 80–100)
MONOCYTES # BLD AUTO: 0.62 K/UL — SIGNIFICANT CHANGE UP (ref 0–0.9)
MONOCYTES NFR BLD AUTO: 5.4 % — SIGNIFICANT CHANGE UP (ref 2–14)
N GONORRHOEA RRNA SPEC QL NAA+PROBE: SIGNIFICANT CHANGE UP
NEUTROPHILS # BLD AUTO: 6.94 K/UL — SIGNIFICANT CHANGE UP (ref 1.8–7.4)
NEUTROPHILS NFR BLD AUTO: 60.3 % — SIGNIFICANT CHANGE UP (ref 43–77)
NITRITE UR-MCNC: NEGATIVE — SIGNIFICANT CHANGE UP
NRBC # BLD: 0 /100 WBCS — SIGNIFICANT CHANGE UP (ref 0–0)
NRBC # FLD: 0 K/UL — SIGNIFICANT CHANGE UP (ref 0–0)
PH UR: 5.5 — SIGNIFICANT CHANGE UP (ref 5–8)
PLATELET # BLD AUTO: 406 K/UL — HIGH (ref 150–400)
POTASSIUM SERPL-MCNC: 3.4 MMOL/L — LOW (ref 3.5–5.3)
POTASSIUM SERPL-MCNC: 3.6 MMOL/L — SIGNIFICANT CHANGE UP (ref 3.5–5.3)
POTASSIUM SERPL-SCNC: 3.4 MMOL/L — LOW (ref 3.5–5.3)
POTASSIUM SERPL-SCNC: 3.6 MMOL/L — SIGNIFICANT CHANGE UP (ref 3.5–5.3)
PROT SERPL-MCNC: 7.3 G/DL — SIGNIFICANT CHANGE UP (ref 6–8.3)
PROT UR-MCNC: NEGATIVE MG/DL — SIGNIFICANT CHANGE UP
RBC # BLD: 3.98 M/UL — SIGNIFICANT CHANGE UP (ref 3.8–5.2)
RBC # FLD: 14.4 % — SIGNIFICANT CHANGE UP (ref 10.3–14.5)
RH IG SCN BLD-IMP: POSITIVE — SIGNIFICANT CHANGE UP
SODIUM SERPL-SCNC: 138 MMOL/L — SIGNIFICANT CHANGE UP (ref 135–145)
SODIUM SERPL-SCNC: 139 MMOL/L — SIGNIFICANT CHANGE UP (ref 135–145)
SP GR SPEC: 1.02 — SIGNIFICANT CHANGE UP (ref 1–1.03)
SPECIMEN SOURCE: SIGNIFICANT CHANGE UP
T VAGINALIS SPEC QL WET PREP: SIGNIFICANT CHANGE UP
UROBILINOGEN FLD QL: 0.2 MG/DL — SIGNIFICANT CHANGE UP (ref 0.2–1)
WBC # BLD: 11.51 K/UL — HIGH (ref 3.8–10.5)
WBC # FLD AUTO: 11.51 K/UL — HIGH (ref 3.8–10.5)

## 2024-04-18 PROCEDURE — 99233 SBSQ HOSP IP/OBS HIGH 50: CPT | Mod: GC

## 2024-04-18 RX ORDER — HEPARIN SODIUM 5000 [USP'U]/ML
5000 INJECTION INTRAVENOUS; SUBCUTANEOUS EVERY 12 HOURS
Refills: 0 | Status: DISCONTINUED | OUTPATIENT
Start: 2024-04-18 | End: 2024-04-19

## 2024-04-18 RX ORDER — KETOROLAC TROMETHAMINE 30 MG/ML
30 SYRINGE (ML) INJECTION EVERY 6 HOURS
Refills: 0 | Status: DISCONTINUED | OUTPATIENT
Start: 2024-04-18 | End: 2024-04-18

## 2024-04-18 RX ORDER — KETOROLAC TROMETHAMINE 30 MG/ML
15 SYRINGE (ML) INJECTION ONCE
Refills: 0 | Status: DISCONTINUED | OUTPATIENT
Start: 2024-04-18 | End: 2024-04-18

## 2024-04-18 RX ORDER — IBUPROFEN 200 MG
600 TABLET ORAL EVERY 6 HOURS
Refills: 0 | Status: DISCONTINUED | OUTPATIENT
Start: 2024-04-18 | End: 2024-04-18

## 2024-04-18 RX ORDER — HYDROCORTISONE 1 %
1 OINTMENT (GRAM) TOPICAL
Refills: 0 | Status: DISCONTINUED | OUTPATIENT
Start: 2024-04-18 | End: 2024-04-19

## 2024-04-18 RX ORDER — CEFTRIAXONE 500 MG/1
1000 INJECTION, POWDER, FOR SOLUTION INTRAMUSCULAR; INTRAVENOUS EVERY 24 HOURS
Refills: 0 | Status: DISCONTINUED | OUTPATIENT
Start: 2024-04-19 | End: 2024-04-19

## 2024-04-18 RX ORDER — POLYETHYLENE GLYCOL 3350 17 G/17G
17 POWDER, FOR SOLUTION ORAL DAILY
Refills: 0 | Status: DISCONTINUED | OUTPATIENT
Start: 2024-04-18 | End: 2024-04-19

## 2024-04-18 RX ORDER — SENNA PLUS 8.6 MG/1
2 TABLET ORAL AT BEDTIME
Refills: 0 | Status: DISCONTINUED | OUTPATIENT
Start: 2024-04-18 | End: 2024-04-18

## 2024-04-18 RX ORDER — METRONIDAZOLE 500 MG
500 TABLET ORAL ONCE
Refills: 0 | Status: COMPLETED | OUTPATIENT
Start: 2024-04-18 | End: 2024-04-18

## 2024-04-18 RX ORDER — METRONIDAZOLE 500 MG
500 TABLET ORAL EVERY 12 HOURS
Refills: 0 | Status: DISCONTINUED | OUTPATIENT
Start: 2024-04-18 | End: 2024-04-19

## 2024-04-18 RX ORDER — CEFTRIAXONE 500 MG/1
1000 INJECTION, POWDER, FOR SOLUTION INTRAMUSCULAR; INTRAVENOUS ONCE
Refills: 0 | Status: COMPLETED | OUTPATIENT
Start: 2024-04-18 | End: 2024-04-18

## 2024-04-18 RX ORDER — ACETAMINOPHEN 500 MG
975 TABLET ORAL EVERY 6 HOURS
Refills: 0 | Status: DISCONTINUED | OUTPATIENT
Start: 2024-04-18 | End: 2024-04-19

## 2024-04-18 RX ORDER — ONDANSETRON 8 MG/1
4 TABLET, FILM COATED ORAL EVERY 6 HOURS
Refills: 0 | Status: DISCONTINUED | OUTPATIENT
Start: 2024-04-18 | End: 2024-04-19

## 2024-04-18 RX ORDER — SODIUM CHLORIDE 9 MG/ML
1000 INJECTION, SOLUTION INTRAVENOUS
Refills: 0 | Status: DISCONTINUED | OUTPATIENT
Start: 2024-04-18 | End: 2024-04-19

## 2024-04-18 RX ORDER — SENNA PLUS 8.6 MG/1
2 TABLET ORAL AT BEDTIME
Refills: 0 | Status: DISCONTINUED | OUTPATIENT
Start: 2024-04-18 | End: 2024-04-19

## 2024-04-18 RX ORDER — ACETAMINOPHEN 500 MG
975 TABLET ORAL EVERY 6 HOURS
Refills: 0 | Status: DISCONTINUED | OUTPATIENT
Start: 2024-04-18 | End: 2024-04-18

## 2024-04-18 RX ORDER — DIPHENHYDRAMINE HCL 50 MG
25 CAPSULE ORAL ONCE
Refills: 0 | Status: COMPLETED | OUTPATIENT
Start: 2024-04-18 | End: 2025-03-17

## 2024-04-18 RX ORDER — POLYETHYLENE GLYCOL 3350 17 G/17G
17 POWDER, FOR SOLUTION ORAL ONCE
Refills: 0 | Status: COMPLETED | OUTPATIENT
Start: 2024-04-18 | End: 2024-04-18

## 2024-04-18 RX ORDER — PETROLATUM,WHITE
1 JELLY (GRAM) TOPICAL DAILY
Refills: 0 | Status: DISCONTINUED | OUTPATIENT
Start: 2024-04-18 | End: 2024-04-19

## 2024-04-18 RX ORDER — IBUPROFEN 200 MG
600 TABLET ORAL EVERY 6 HOURS
Refills: 0 | Status: DISCONTINUED | OUTPATIENT
Start: 2024-04-18 | End: 2024-04-19

## 2024-04-18 RX ADMIN — Medication 600 MILLIGRAM(S): at 17:42

## 2024-04-18 RX ADMIN — Medication 975 MILLIGRAM(S): at 11:41

## 2024-04-18 RX ADMIN — Medication 975 MILLIGRAM(S): at 23:27

## 2024-04-18 RX ADMIN — SODIUM CHLORIDE 125 MILLILITER(S): 9 INJECTION, SOLUTION INTRAVENOUS at 03:11

## 2024-04-18 RX ADMIN — Medication 100 MILLIGRAM(S): at 03:11

## 2024-04-18 RX ADMIN — POLYETHYLENE GLYCOL 3350 17 GRAM(S): 17 POWDER, FOR SOLUTION ORAL at 03:11

## 2024-04-18 RX ADMIN — Medication 600 MILLIGRAM(S): at 23:26

## 2024-04-18 RX ADMIN — SENNA PLUS 2 TABLET(S): 8.6 TABLET ORAL at 21:32

## 2024-04-18 RX ADMIN — Medication 975 MILLIGRAM(S): at 12:20

## 2024-04-18 RX ADMIN — Medication 100 MILLIGRAM(S): at 05:53

## 2024-04-18 RX ADMIN — Medication 1 APPLICATION(S): at 15:13

## 2024-04-18 RX ADMIN — Medication 975 MILLIGRAM(S): at 17:42

## 2024-04-18 RX ADMIN — Medication 600 MILLIGRAM(S): at 11:40

## 2024-04-18 RX ADMIN — HEPARIN SODIUM 5000 UNIT(S): 5000 INJECTION INTRAVENOUS; SUBCUTANEOUS at 17:43

## 2024-04-18 RX ADMIN — Medication 600 MILLIGRAM(S): at 12:20

## 2024-04-18 RX ADMIN — Medication 100 MILLIGRAM(S): at 17:42

## 2024-04-18 RX ADMIN — Medication 15 MILLIGRAM(S): at 01:36

## 2024-04-18 RX ADMIN — CEFTRIAXONE 100 MILLIGRAM(S): 500 INJECTION, POWDER, FOR SOLUTION INTRAMUSCULAR; INTRAVENOUS at 01:36

## 2024-04-18 RX ADMIN — CEFTRIAXONE 100 MILLIGRAM(S): 500 INJECTION, POWDER, FOR SOLUTION INTRAMUSCULAR; INTRAVENOUS at 23:27

## 2024-04-18 RX ADMIN — Medication 1 APPLICATION(S): at 17:43

## 2024-04-18 RX ADMIN — Medication 100 MILLIGRAM(S): at 15:12

## 2024-04-18 RX ADMIN — Medication 30 MILLIGRAM(S): at 05:53

## 2024-04-18 NOTE — PATIENT PROFILE ADULT - FALL HARM RISK - HARM RISK INTERVENTIONS

## 2024-04-18 NOTE — PATIENT PROFILE ADULT - FUNCTIONAL ASSESSMENT - BASIC MOBILITY SCORE.
Assessment & Plan   (Z48.02) Visit for suture removal  (primary encounter diagnosis)  Suture removal:     Date sutures applied: 10/12/2021         Where (setting) in which they applied:ER visit    Description:  Type: sutures  Location: chin    History:    Cause of laceration: Fall    Accompanying Signs & Symptoms: (staff: if yes-describe)  Redness: no  Warmth: no  Drainage: no  Still bleeding: no  Fevers: no    Last tetanus shot: last tetanus booster within 10 years      Follow Up  Return in about 6 months (around 4/19/2022) for Return for scheduled annual checkup with PCP.  If not improving or if worsening    David Kee PA-C        Subjective   Argelia is a 1 year old who presents for the following health issues  accompanied by her father    HPI     ED/UC Followup:    Facility:  [facility]  Date of visit: 10/12/21  Reason for visit: Chin laceration  Current Status: Would like sutures removed.     Patient is a 1 year old female who is brought in by her father for a suture removal. Sutures were placed 1 week ago at the [facility] ED following fall onto the kitchen floor. Father informs me that the patient has done well since the suture placement and denies redness, swelling, discharge.     Argelia is a 1 year old 0 month old seen in the emergency department with chin laceration that occurred this evening.  Patient was excited to see her dad come home from work and was running when she tripped and fell.  She had slight amount of blood coming from the mouth which we discovered later to be a small tongue abrasion/minor laceration.  Bleeding was already controlled.  Patient has a 1 and half centimeter laceration to the chin.  We initially closed this with skin glue, but shortly after the patient was put in her car seat, she pulled the glue off.  We then proceeded with placement of stitches.    Review of Systems   Constitutional, eye, ENT, skin, respiratory, cardiac, and GI are normal except as otherwise  "noted.      Objective    Pulse 140   Temp 97.8  F (36.6  C) (Temporal)   Resp 20   Ht 0.829 m (2' 8.64\")   Wt 12.5 kg (27 lb 9.6 oz)   BMI 18.22 kg/m    60 %ile (Z= 0.24) based on CDC (Girls, 2-20 Years) weight-for-age data using vitals from 10/19/2021.     Physical Exam   GENERAL: Active, alert, in no acute distress.  SKIN: 3 simple sutures in inferior chin, small well healed laceration.   HEAD: Normocephalic.  EYES:  No discharge or erythema. Normal pupils and EOM.  NOSE: Normal without discharge.  NECK: Supple, no masses.  LUNGS: Clear. No rales, rhonchi, wheezing or retractions  HEART: Regular rhythm. Normal S1/S2. No murmurs.  PSYCH: Age-appropriate alertness and orientation    Diagnostics: None            " 23

## 2024-04-18 NOTE — CHART NOTE - NSCHARTNOTEFT_GEN_A_CORE
Patient ordered for MRI abdomen and pelvis with IV contrast for better characterization of right adnexal structure. Chart noted patient allergic to IV contrast, does not specify gadolinium vs iodine contrast. Spoke to patient, states in past she has received multiple MRI's and CT's with IV contrast in which she has developing itching and hives and has recently been noting it as an allergy. She states after receiving contrast she has never noted her throat closing, shortness of breath, facial swelling, etc. Patient agreeable to try IV contrast with pre-medication of benadryl.     Dose of benadryl to be ordered by GYN team to be administered prior to contrast.    NANDA Cabrera  d/w GYN team Patient ordered for MRI abdomen and pelvis with IV contrast for better characterization of right adnexal structure. Chart noted patient allergic to IV contrast, does not specify gadolinium vs iodine contrast. Spoke to patient, states in past she has received multiple MRI's and CT's with IV contrast in which she has developing itching and hives and has recently been noting it as an allergy due to the itching. Patient states each time the itching has resolved without concern. She states after receiving contrast she has never noted throat tightness, chest tightness, shortness of breath, facial swelling, etc. Patient agreeable to try IV contrast with pre-medication of benadryl.     Dose of benadryl to be ordered by GYN team to be administered prior to contrast.    NANDA Cabrera  d/w GYN team

## 2024-04-18 NOTE — CONSULT NOTE ADULT - SUBJECTIVE AND OBJECTIVE BOX
Interventional Radiology      HPI: 23y Female with history of endometriosis, right tubo-ovarian abscess s/p salpingooophorectomy presents with abdominal pain. Pelvic US revealed right adnexal fluid collection, for which IR was consulted for aspiration/drainage.    Allergies: IV Contrast (Hives; Urticaria)    Medications (Abx/Cardiac/Anticoagulation/Blood Products)    cefTRIAXone   IVPB: 100 mL/Hr IV Intermittent (04-18 @ 01:36)  doxycycline IVPB: 100 mL/Hr IV Intermittent (04-18 @ 05:53)  metroNIDAZOLE  IVPB: 100 mL/Hr IV Intermittent (04-18 @ 03:11)    Data:    91.2  T(C): 36.6  HR: 86  BP: 101/53  RR: 18  SpO2: 94%    -WBC 11.51 / HgB 10.5 / Hct 33.3 / Plt 406  -Na 139 / Cl 108 / BUN 9 / Glucose 95  -K 3.4 / CO2 19 / Cr 0.54  -ALT -- / Alk Phos -- / T.Bili --  -INR 1.40 / PTT 26.9      Radiology:   Imaging reviewed.    Assessment/Plan:   23y Female with history of endometriosis, right tubo-ovarian abscess s/p salpingooophorectomy presents with abdominal pain. Pelvic US revealed right adnexal fluid collection, for which IR was consulted for aspiration/drainage.    -- please obtain cross-sectional imaging (MR with contrast or noncontrast CT)   -- IR will plan to perform image-guided aspiration/drainage of right adnexal fluid collection thereafter  -- please reach out to IR for scheduling once cross-sectional imaging has been obtained      --  Bryce Orellana MD PGY-3  Available on [employer] Teams    - Non-emergent consults: Place IR consult order in [place]  - Emergent issues (pager): [facility] [phone]; [facility] [phone]; [phone]  - Scheduling questions: [facility] [phone]; [facility] [phone]  - Clinic/outpatient booking: [facility] [phone]; [facility] [phone] Interventional Radiology      HPI: 23y Female with history of endometriosis, right tubo-ovarian abscess s/p salpingooophorectomy presents with abdominal pain. Pelvic US revealed right adnexal fluid collection, for which IR was consulted for aspiration/drainage.    Allergies: IV Contrast (Hives; Urticaria)    Medications (Abx/Cardiac/Anticoagulation/Blood Products)    cefTRIAXone   IVPB: 100 mL/Hr IV Intermittent (04-18 @ 01:36)  doxycycline IVPB: 100 mL/Hr IV Intermittent (04-18 @ 05:53)  metroNIDAZOLE  IVPB: 100 mL/Hr IV Intermittent (04-18 @ 03:11)    Data:    91.2  T(C): 36.6  HR: 86  BP: 101/53  RR: 18  SpO2: 94%    -WBC 11.51 / HgB 10.5 / Hct 33.3 / Plt 406  -Na 139 / Cl 108 / BUN 9 / Glucose 95  -K 3.4 / CO2 19 / Cr 0.54  -ALT -- / Alk Phos -- / T.Bili --  -INR 1.40 / PTT 26.9      Radiology:   Imaging reviewed.    Assessment/Plan:   23y Female with history of endometriosis, right tubo-ovarian abscess s/p salpingooophorectomy presents with abdominal pain. Pelvic US revealed right adnexal fluid collection, for which IR was consulted for aspiration/drainage.    -- please obtain cross-sectional imaging (MR with contrast or noncontrast CT due to history of contrast allergy)   -- Will need cross sectional imaging to evaluate fo percutaneous window.   -- please reach out to IR for scheduling once cross-sectional imaging has been obtained      --  Bryce Orellana MD PGY-3  Available on Microsoft Teams    - Non-emergent consults: Place IR consult order in [place]  - Emergent issues (pager): [facility] [phone]; [facility] [phone]; [phone]  - Scheduling questions: [facility] [phone]; [facility] [phone]  - Clinic/outpatient booking: [facility] [phone]; [facility] [phone]

## 2024-04-18 NOTE — H&P ADULT - HISTORY OF PRESENT ILLNESS
ANABEL STUBBS  23y  Female [id number]    HPI: 23y G0, LMP 3/9/2024, who presented to the clinic earlier today for a second opinion regarding her endometriosis who was sent to the ED for further evaluation given severe pain in the setting of her significant GYN hx. Patient reports having diffuse abdominal pain since Sunday without any appreciable alleviating or exacerbating factors. Says that her last sufficient BM was on Monday and that she is unsure of when the last time she passed flatus. States that this "feels like constipation," and this different from her endometriosis pain which is moreso akin to "glass." Denies fevers, nausea, vomiting, dysuria, vaginal discharge, chest pain, shortness of breath, diarrhea. Endorses subjective chills     GYN hx:   5/25/2022: Dx LSC with dense adhesions requiring conversion to ex-lap, RSO, and left ovarian cystectomy, ADENIKE, omentectomy with assistance of Gyn Oncology (5/25/22) for TOA with postoperative course c/b superficial wound opening requiring home wound packing  10/2023: Admitted to [facility] for TOA? vs. infected endometrioma? Reports being given IV abx and undergoing drainage  3/2024: Admitted to [facility] for TOA? vs. infected endometrioma? Denies being drained during this admission and took her abx thereafter   - States that her pain had resolved after her treatment there    Name of Ob/Gyn Physician: Dr. Candace Rizvi (in the [facility]), Dr. DAILY" at [facility] GYN clinic most recently for second opinion     POB: G0   PGyn: Above, Endometriosis  - Sexually active with men (last encounter 4-5/2023) and women (3/2024)  MedHx: Eczema  SurgHx: Above   Meds: Ibuprofen, Acetaminophen   Allergies: Contrast (hives)  Social: Denies any tobacco use, drug use, or alcohol use     Vital Signs Last 24 Hrs  T(C): 36.8 (17 Apr 2024 19:33), Max: 36.8 (17 Apr 2024 19:33)  T(F): 98.2 (17 Apr 2024 19:33), Max: 98.2 (17 Apr 2024 19:33)  HR: 95 (17 Apr 2024 19:33) (95 - 95)  BP: 130/89 (17 Apr 2024 19:33) (130/89 - 130/89)  BP(mean): --  RR: 18 (17 Apr 2024 19:33) (18 - 18)  SpO2: 100% (17 Apr 2024 19:33) (100% - 100%)    Parameters below as of 17 Apr 2024 19:33  Patient On (Oxygen Delivery Method): room air        Physical Exam:   General: Awake. Alert. Mild distress   Lungs: Unlabored breathing. No respiratory distress   Abd: Soft. Distended. Diffusely tender. No guarding. No pain elicited with jostling of stretcher . Infraumbilical midline abdominal incision    (deferred): Prior exam by GYN resident in the clinic noted CMT and tenderness to palpation of the uterus   - GC/CT and AFFIRM swabs had been obtained in the clinic   Ext: No calf tenderness bilaterally  Skin: Diffuse eczema noted

## 2024-04-18 NOTE — PROGRESS NOTE ADULT - ASSESSMENT
23y G0, LMP 3/9/2024, sent in to the ED from GYN clinic for diffuse abdominal pain in the setting of known endometriosis, hz of TOA, and complex surgical history. Patient admitted for IV antibiotics and further work up of right pelvic cyst. Pt afebrile during admission. Pain improved this AM. Continuing IV antibiotics.     Neuro: Acetaminophen and Toradol PRN -   CV: Hemodynamically stable  - For AM CBC   Pulm: Saturating well on RA. No acute issues   GI: Regular diet  - Zofran PRN for nausea  - MiraLax and Senna for constipation  : Voiding spontaneously  Heme: HSQ for DVT ppx. SCDs while in bed   FEN: LR@125, Replete electrolytes PRN  - f/u BMP   ID:  - WBC of 13 on admission - f/u AM CBC    - Ceftriaxone/Doxy/Metronidazole (4/18-)  - GC/CT and AFFIRM swabs pending  - Will attempt to reach out for [facility] this AM  Reproductive:   - TVUS (4/18): Uterus: Low-density posterior fundal intramural lesion likely reflecting cystic degeneration of a fibroid, measuring 3.6 x 3.4   cm. A cystic lesion in the mid uterine body measures 2.4 x 2.1 cm. Endometrium: 12 mm. Within normal limits.      Right adnexa: A septated fluid collection measures 5.1 x 3.0 x 5.7 cm. S/P R oophorectomy    - [facility]    June Ruiz, PGY-3

## 2024-04-18 NOTE — CONSULT NOTE ADULT - CONSULT REASON
Sent from [facility] GYN clinic-[place]
Aspiration/drainage of right adnexal fluid collection

## 2024-04-18 NOTE — CHART NOTE - NSCHARTNOTEFT_GEN_A_CORE
HIPPA release consent form signed by patient and faxed to [facility] to obtain medical records    Maria M Bush PGY1 Waleska Meyer is a 20 year old female presenting to the walk-in clinic today for bad cough, sore throat, and some body aches since yesterday. Denies any fever. Fully vaccinated. Possible exposure to family members who currently have covid.      Swabs/Specimens collected during rooming process:    COVID PCR - rapid and Strep PCR         PPE worn during room process    Writer: N95, Face shield/eye protection, gown, gloves    Patient: mask      Patient would like communication of their results via:         Cell Phone:   Telephone Information:   Mobile [phone]     Okay to leave a message containing results? Yes

## 2024-04-18 NOTE — H&P ADULT - ASSESSMENT
23y G0, LMP 3/9/2024, who presented to the ED from clinic with diffuse abdominal pain and subjective chills in the setting of  prior dx lsc/ex-lap for TOA and two recent admissions for TOA (?) vs. infected endometrioma at an outside hospital. Evaluation in the clinic and ED notable for CMT, leukocytosis of 13.43, a right adnexal septated fluid collection measures 5.1 x 3.0 x 5.7 cm, and degeneration of fibroids. It is unclear if the radiographic findings are new or old given prior records are not accessible to the examiner. Nevertheless, given the fluid collection, CMT, pain, and leukocytosis, we had recommended to for admission for empiric treatment of the right-sided pelvic collection with IV antibiotics. Patient afebrile here. Reviewed that the differential additionally includes constipation and degenerating fibroids     Neuro: Acetaminophen and Toradol PRN  CV: Hemodynamically stable  - For AM CBC   Pulm: Saturating well on RA. No acute issues   GI: Regular diet  - Zofran PRN for nausea  - MiraLax and Senna for constipation  : Voiding spontaneously  Heme: HSQ for DVT ppx. SCDs while in bed   FEN: LR@125, Replete electrolytes PRN  - BMP in the AM  ID: Ceftriaxone/Doxy/Metronidazole (4/18-)  - GC/CT and AFFIRM swabs pending  - Will attempt to reach out for [facility] in the AM  Endo: No active issues   Dispo: Admit for IV abx     Timi Lucas, PGY-2  Obstetrics and Gynecology    seen and evaluated w/ Dr. VINNY Gonzalez, service attending                23y G0, LMP 3/9/2024, who presented to the ED from clinic with diffuse abdominal pain and subjective chills in the setting of  prior dx lsc/ex-lap for TOA and two recent admissions for TOA (?) vs. infected endometrioma at an outside hospital. Evaluation in the clinic and ED notable for CMT, leukocytosis of 13.43, a right adnexal septated fluid collection measures 5.1 x 3.0 x 5.7 cm, and degeneration of fibroids. It is unclear if the radiographic findings are new or old given prior records are not accessible to the examiner. Nevertheless, given the fluid collection, CMT, pain, and leukocytosis, we had recommended to for admission for empiric treatment of the right-sided pelvic collection with IV antibiotics. Patient afebrile here. Reviewed that the differential additionally includes constipation and degenerating fibroids     Neuro: Acetaminophen and Toradol PRN  CV: Hemodynamically stable  - For AM CBC   Pulm: Saturating well on RA. No acute issues   GI: Regular diet  - Zofran PRN for nausea  - MiraLax and Senna for constipation  : Voiding spontaneously  Heme: HSQ for DVT ppx. SCDs while in bed   FEN: LR@125, Replete electrolytes PRN  - BMP in the AM  ID: Ceftriaxone/Doxy/Metronidazole (4/18-)  - GC/CT and AFFIRM swabs pending  - Will attempt to reach out for [facility] in the AM  Endo: Ordered for home OCP ([facility])  Dispo: Admit for IV abx     Timi Lucas, PGY-2  Obstetrics and Gynecology    seen and evaluated w/ Dr. VINNY Gonzalez, service attending                23y G0, LMP 3/9/2024, who presented to the ED from clinic with diffuse abdominal pain and subjective chills in the setting of  prior dx lsc/ex-lap for TOA and two recent admissions for TOA (?) vs. infected endometrioma at an outside hospital. Evaluation in the clinic and ED today notable for CMT, leukocytosis of 13.43, a right adnexal septated fluid collection measures 5.1 x 3.0 x 5.7 cm, and degeneration of fibroids. It is unclear if the radiographic findings are new or old given prior records are not accessible to the examiner. Nevertheless, given the fluid collection, CMT, pain, and leukocytosis, we had recommended to for admission for empiric treatment of the right-sided pelvic collection with IV antibiotics. Patient afebrile here. Reviewed that the differential additionally includes constipation and degenerating fibroids     Neuro: Acetaminophen and Toradol PRN  CV: Hemodynamically stable  - For AM CBC   Pulm: Saturating well on RA. No acute issues   GI: Regular diet  - Zofran PRN for nausea  - MiraLax and Senna for constipation  : Voiding spontaneously  Heme: HSQ for DVT ppx. SCDs while in bed   FEN: LR@125, Replete electrolytes PRN  - BMP in the AM  ID: Ceftriaxone/Doxy/Metronidazole (4/18-)  - GC/CT and AFFIRM swabs pending  - Will attempt to reach out for [facility] in the AM  Endo: Ordered for home OCP ([facility])  Dispo: Admit for IV abx     Timi Lucas, PGY-2  Obstetrics and Gynecology    seen and evaluated w/ Dr. VINNY Gonzalez, service attending

## 2024-04-19 ENCOUNTER — TRANSCRIPTION ENCOUNTER (OUTPATIENT)
Age: 24
End: 2024-04-19

## 2024-04-19 VITALS
SYSTOLIC BLOOD PRESSURE: 112 MMHG | OXYGEN SATURATION: 100 % | HEART RATE: 83 BPM | RESPIRATION RATE: 17 BRPM | DIASTOLIC BLOOD PRESSURE: 67 MMHG | TEMPERATURE: 98 F

## 2024-04-19 LAB
ANION GAP SERPL CALC-SCNC: 11 MMOL/L — SIGNIFICANT CHANGE UP (ref 7–14)
BASOPHILS # BLD AUTO: 0.02 K/UL — SIGNIFICANT CHANGE UP (ref 0–0.2)
BASOPHILS NFR BLD AUTO: 0.3 % — SIGNIFICANT CHANGE UP (ref 0–2)
BLD GP AB SCN SERPL QL: NEGATIVE — SIGNIFICANT CHANGE UP
BUN SERPL-MCNC: 8 MG/DL — SIGNIFICANT CHANGE UP (ref 7–23)
C TRACH RRNA SPEC QL NAA+PROBE: SIGNIFICANT CHANGE UP
CALCIUM SERPL-MCNC: 8 MG/DL — LOW (ref 8.4–10.5)
CHLORIDE SERPL-SCNC: 105 MMOL/L — SIGNIFICANT CHANGE UP (ref 98–107)
CO2 SERPL-SCNC: 19 MMOL/L — LOW (ref 22–31)
CREAT SERPL-MCNC: 0.48 MG/DL — LOW (ref 0.5–1.3)
CULTURE RESULTS: SIGNIFICANT CHANGE UP
EGFR: 136 ML/MIN/1.73M2 — SIGNIFICANT CHANGE UP
EOSINOPHIL # BLD AUTO: 0.65 K/UL — HIGH (ref 0–0.5)
EOSINOPHIL NFR BLD AUTO: 9.1 % — HIGH (ref 0–6)
GLUCOSE SERPL-MCNC: 96 MG/DL — SIGNIFICANT CHANGE UP (ref 70–99)
HCT VFR BLD CALC: 32.9 % — LOW (ref 34.5–45)
HGB BLD-MCNC: 10.1 G/DL — LOW (ref 11.5–15.5)
IANC: 4.4 K/UL — SIGNIFICANT CHANGE UP (ref 1.8–7.4)
IMM GRANULOCYTES NFR BLD AUTO: 0.1 % — SIGNIFICANT CHANGE UP (ref 0–0.9)
LYMPHOCYTES # BLD AUTO: 1.7 K/UL — SIGNIFICANT CHANGE UP (ref 1–3.3)
LYMPHOCYTES # BLD AUTO: 23.7 % — SIGNIFICANT CHANGE UP (ref 13–44)
MCHC RBC-ENTMCNC: 25.2 PG — LOW (ref 27–34)
MCHC RBC-ENTMCNC: 30.7 GM/DL — LOW (ref 32–36)
MCV RBC AUTO: 82 FL — SIGNIFICANT CHANGE UP (ref 80–100)
MONOCYTES # BLD AUTO: 0.38 K/UL — SIGNIFICANT CHANGE UP (ref 0–0.9)
MONOCYTES NFR BLD AUTO: 5.3 % — SIGNIFICANT CHANGE UP (ref 2–14)
N GONORRHOEA RRNA SPEC QL NAA+PROBE: SIGNIFICANT CHANGE UP
NEUTROPHILS # BLD AUTO: 4.4 K/UL — SIGNIFICANT CHANGE UP (ref 1.8–7.4)
NEUTROPHILS NFR BLD AUTO: 61.5 % — SIGNIFICANT CHANGE UP (ref 43–77)
NRBC # BLD: 0 /100 WBCS — SIGNIFICANT CHANGE UP (ref 0–0)
NRBC # FLD: 0 K/UL — SIGNIFICANT CHANGE UP (ref 0–0)
PLATELET # BLD AUTO: 375 K/UL — SIGNIFICANT CHANGE UP (ref 150–400)
POTASSIUM SERPL-MCNC: 3.7 MMOL/L — SIGNIFICANT CHANGE UP (ref 3.5–5.3)
POTASSIUM SERPL-SCNC: 3.7 MMOL/L — SIGNIFICANT CHANGE UP (ref 3.5–5.3)
RBC # BLD: 4.01 M/UL — SIGNIFICANT CHANGE UP (ref 3.8–5.2)
RBC # FLD: 14.6 % — HIGH (ref 10.3–14.5)
RH IG SCN BLD-IMP: POSITIVE — SIGNIFICANT CHANGE UP
SODIUM SERPL-SCNC: 135 MMOL/L — SIGNIFICANT CHANGE UP (ref 135–145)
SPECIMEN SOURCE: SIGNIFICANT CHANGE UP
SPECIMEN SOURCE: SIGNIFICANT CHANGE UP
WBC # BLD: 7.16 K/UL — SIGNIFICANT CHANGE UP (ref 3.8–10.5)
WBC # FLD AUTO: 7.16 K/UL — SIGNIFICANT CHANGE UP (ref 3.8–10.5)

## 2024-04-19 PROCEDURE — 72197 MRI PELVIS W/O & W/DYE: CPT | Mod: 26

## 2024-04-19 PROCEDURE — 99238 HOSP IP/OBS DSCHRG MGMT 30/<: CPT | Mod: GC

## 2024-04-19 RX ORDER — DIPHENHYDRAMINE HCL 50 MG
25 CAPSULE ORAL ONCE
Refills: 0 | Status: COMPLETED | OUTPATIENT
Start: 2024-04-19 | End: 2024-04-19

## 2024-04-19 RX ADMIN — SODIUM CHLORIDE 125 MILLILITER(S): 9 INJECTION, SOLUTION INTRAVENOUS at 09:05

## 2024-04-19 RX ADMIN — HEPARIN SODIUM 5000 UNIT(S): 5000 INJECTION INTRAVENOUS; SUBCUTANEOUS at 05:13

## 2024-04-19 RX ADMIN — Medication 600 MILLIGRAM(S): at 14:10

## 2024-04-19 RX ADMIN — POLYETHYLENE GLYCOL 3350 17 GRAM(S): 17 POWDER, FOR SOLUTION ORAL at 13:42

## 2024-04-19 RX ADMIN — Medication 100 MILLIGRAM(S): at 05:13

## 2024-04-19 RX ADMIN — Medication 975 MILLIGRAM(S): at 14:10

## 2024-04-19 RX ADMIN — Medication 100 MILLIGRAM(S): at 02:41

## 2024-04-19 RX ADMIN — Medication 600 MILLIGRAM(S): at 00:27

## 2024-04-19 RX ADMIN — Medication 1 APPLICATION(S): at 13:42

## 2024-04-19 RX ADMIN — Medication 975 MILLIGRAM(S): at 06:13

## 2024-04-19 RX ADMIN — Medication 975 MILLIGRAM(S): at 13:42

## 2024-04-19 RX ADMIN — Medication 600 MILLIGRAM(S): at 06:13

## 2024-04-19 RX ADMIN — Medication 600 MILLIGRAM(S): at 05:13

## 2024-04-19 RX ADMIN — Medication 975 MILLIGRAM(S): at 00:27

## 2024-04-19 RX ADMIN — Medication 600 MILLIGRAM(S): at 13:42

## 2024-04-19 RX ADMIN — Medication 1 APPLICATION(S): at 05:13

## 2024-04-19 RX ADMIN — Medication 25 MILLIGRAM(S): at 09:04

## 2024-04-19 RX ADMIN — Medication 100 MILLIGRAM(S): at 16:36

## 2024-04-19 RX ADMIN — Medication 975 MILLIGRAM(S): at 05:13

## 2024-04-19 NOTE — DISCHARGE NOTE NURSING/CASE MANAGEMENT/SOCIAL WORK - PATIENT PORTAL LINK FT
You can access the FollowMyHealth Patient Portal offered by [facility] by registering at the following website: [email or web address]. By joining [facility]’s FollowMyHealth portal, you will also be able to view your health information using other applications (apps) compatible with our system.

## 2024-04-19 NOTE — DISCHARGE NOTE PROVIDER - PROVIDER TOKENS
FREE:[LAST:[[facility]],PHONE:[phone]],FAX:[(   )    -],ADDRESS:[[phone] [place] [address]  Ambulatory Care Unit  Oncology Building, Level C],FOLLOWUP:[2 weeks]],FREE:[LAST:[[facility]],PHONE:[phone]:[(   )    -],FOLLOWUP:[2 weeks]]

## 2024-04-19 NOTE — PROGRESS NOTE ADULT - SUBJECTIVE AND OBJECTIVE BOX
P1 GYN Progress Note  Pt seen and examined at bedside. Patient reports that her pain has been responsive to medication. Not in any pain this AM. Denies N/V, SOB, CP, fever, chills, bowel or urinary changes.     Physical exam:    Vital Signs Last 24 Hrs  T(F): 97.9 (19 Apr 2024 05:10), Max: 98.6 (18 Apr 2024 17:07)  HR: 81 (19 Apr 2024 05:10) (81 - 89)  BP: 104/53 (19 Apr 2024 05:10) (101/53 - 121/63)  RR: 18 (19 Apr 2024 05:10) (17 - 18)  SpO2: 98% (19 Apr 2024 05:10) (94% - 99%)    Gen: alert, oriented  CVS: RRR  Lungs: CTAB  Abdomen: Soft, nontender, non distended. No rebound, rigidity or guarding. Incision clean, dry, intact  Perineum: no active bleeding.  Ext: No calf tenderness    Diet:    MEDICATIONS  (STANDING):  acetaminophen     Tablet .. 975 milliGRAM(s) Oral every 6 hours  AQUAPHOR (petrolatum Ointment) 1 Application(s) Topical daily  cefTRIAXone   IVPB 1000 milliGRAM(s) IV Intermittent every 24 hours  diphenhydrAMINE Injectable 25 milliGRAM(s) IV Push once  doxycycline IVPB 100 milliGRAM(s) IV Intermittent two times a day  Ethinyl estradiol 0.025 mg and norgestimate 0.215 mg 1 Tablet(s) 1 Tablet(s) Oral daily  heparin   Injectable 5000 Unit(s) SubCutaneous every 12 hours  hydrocortisone 1% Ointment 1 Application(s) Topical two times a day  ibuprofen  Tablet. 600 milliGRAM(s) Oral every 6 hours  lactated ringers. 1000 milliLiter(s) (125 mL/Hr) IV Continuous <Continuous>  metroNIDAZOLE  IVPB 500 milliGRAM(s) IV Intermittent every 12 hours  polyethylene glycol 3350 17 Gram(s) Oral daily  senna 2 Tablet(s) Oral at bedtime    MEDICATIONS  (PRN):  ondansetron Injectable 4 milliGRAM(s) IV Push every 6 hours PRN Nausea and/or Vomiting      LABS:                        10.5   11.51 )-----------( 406      ( 18 Apr 2024 06:25 )             33.3                         11.5   13.43 )-----------( 477      ( 17 Apr 2024 23:07 )             36.2     ABO Interpretation: A (04-18 @ 06:45)  Rh Interpretation: Positive (04-18 @ 06:45)  Antibody Screen: Negative (04-18 @ 06:45)    04-18-24 @ 06:25      139  |  108<H>  |  9   ----------------------------<  95  3.4<L>   |  19<L>  |  0.54    04-17-24 @ 23:07      138  |  104  |  10  ----------------------------<  89  3.6   |  20<L>  |  0.57        Ca    7.8<L>      18 Apr 2024 06:25  Ca    8.8      17 Apr 2024 23:07    TPro  7.3  /  Alb  3.9  /  TBili  <0.2  /  DBili  x   /  AST  16  /  ALT  12  /  AlkPhos  57  04-17-24 @ 23:07              04-17-24 @ 07:01  -  04-18-24 @ 07:00  --------------------------------------------------------  IN: 500 mL / OUT: 0 mL / NET: 500 mL    04-18-24 @ 07:01  -  04-19-24 @ 06:43  --------------------------------------------------------  IN: 4250 mL / OUT: 250 mL / NET: 4000 mL          
Patient seen and examined at bedside. Patient reports that her pain has been responsive to medication. Not in any pain this AM. Reports feeling cold but no fevers /chills. Not yet attempting PO but denies N, V, decreased appetite Reports BM yesterday at home, reporting symptoms of incomplete emptying. No flatus since presentation to the hospital. Denies VB or abnormal discharge.     Vital Signs Last 24 Hours  T(C): 36.8 (04-18-24 @ 05:50), Max: 37 (04-18-24 @ 02:56)  HR: 85 (04-18-24 @ 05:50) (85 - 95)  BP: 111/66 (04-18-24 @ 05:50) (111/66 - 130/89)  RR: 17 (04-18-24 @ 05:50) (17 - 18)  SpO2: 100% (04-18-24 @ 05:50) (98% - 100%)    I&O's Summary    Physical Exam:  General: NAD  CV: RR, S1, S2, no M/R/G  Lungs: CTA b/l, good air flow b/l   Abdomen: Soft, pressure to deep palpation in the bilateral lower quadrants with guarding denies pain.   : no bleeding on pad; LR@125  Ext: No pain or swelling     Labs:                        11.5   13.43 )-----------( 477      ( 17 Apr 2024 23:07 )             36.2   baso 0.2    eos 5.1    imm gran 0.4    lymph 17.2   mono 5.7    poly 71.4       MEDICATIONS  (STANDING):  doxycycline IVPB 100 milliGRAM(s) IV Intermittent two times a day  Ethinyl estradiol 0.025 mg and norgestimate 0.215 mg 1 Tablet(s) 1 Tablet(s) Oral daily  heparin   Injectable 5000 Unit(s) SubCutaneous every 12 hours  hydrocortisone 1% Ointment 1 Application(s) Topical two times a day  ketorolac   Injectable 30 milliGRAM(s) IV Push every 6 hours  lactated ringers. 1000 milliLiter(s) (125 mL/Hr) IV Continuous <Continuous>  metroNIDAZOLE  IVPB 500 milliGRAM(s) IV Intermittent every 12 hours  polyethylene glycol 3350 17 Gram(s) Oral daily    MEDICATIONS  (PRN):  acetaminophen     Tablet .. 975 milliGRAM(s) Oral every 6 hours PRN Mild Pain (1 - 3)  ondansetron Injectable 4 milliGRAM(s) IV Push every 6 hours PRN Nausea and/or Vomiting  senna 2 Tablet(s) Oral at bedtime PRN Constipation      A/P: 23y POD#   s/p .  Patient is stable and doing well.      Neuro: PO pain meds   CV: Hemodynamically stable  Pulm: Saturating well on room air, encourage oob/amb  GI: Advance to regular diet vs. Continue regular diet  : UOP adequate, d/c chowdhury  vs. voiding spontaneously   Heme: c/w HSQ and SCDs for DVT ppx  ID: Afebrile  Endo: No active issues   Dispo: Continue routine post-op care    June Ruiz, PGY-3

## 2024-04-19 NOTE — CHART NOTE - NSCHARTNOTEFT_GEN_A_CORE
Medical records received from [facility]   Records consistent of only imaging reports    Pt was seen in 05/22 for abdominal pain, leukocytosis  CTAP (5/22/22) showed a large enhancing structure with complex appearance at the right side of the pelvis 12.5x8x9.7cm and a cystic structure in the left adnexa 4.3x5.1cm. Findings consistent with TVUS obtained at similar time and suspicious for a TOA vs a complex infected hydrosalpinx. Pt during that admission underwent a LSC, requiring conversion to ex-lap due to dense adhesions, RSO, and left ovarian cystectomy, ADENIKE, omentectomy with assistance of Gyn Oncology (5/25/22) for TOA. Repeat CTAP 5/31 - left adnexa thick-walled septated mass 4.2x2.7, appearing to be evolving and becoming smaller.     Pt was seen again in october, 2023 for r/o torsion  CTAP (10/28/23): mildly complex loculated fluid surrounding, 10x9.6x2.5cm loculated fluid in the pelvis. Right ovary surgically absent. No evidence of LO torsion. On 10/30/23, Pt has a CT-guided drainage with aspiration of 10ml of brown fluid. Unsure of any further management done for patient during this admission    Pt seen in the ED 3/22/24 for pelvic pain and r/o ovarian torsion. TVUS (3/22/24) showed uterus with a 1.1cm hyperechoic cystic focus in the right posterior area. No evidence of LO torsion, mild loculated free fluid surrounding the uterine fundus extending to b/l adnexal similar in appearance to prior study.    Maria M Bush PGY1 Medical records received from [facility]   Records consistent of only imaging reports    Pt was seen in 05/22 for abdominal pain, leukocytosis  CTAP (5/22/22) showed a large enhancing structure with complex appearance at the right side of the pelvis 12.5x8x9.7cm and a cystic structure in the left adnexa 4.3x5.1cm. Findings consistent with TVUS obtained at similar time and suspicious for a TOA vs a complex infected hydrosalpinx. Pt during that admission underwent a LSC, requiring conversion to ex-lap due to dense adhesions, RSO, and left ovarian cystectomy, ADENIKE, omentectomy with assistance of Gyn Oncology (5/25/22) for TOA. Repeat CTAP 5/31 - left adnexa thick-walled septated mass 4.2x2.7, appearing to be evolving and becoming smaller.     Pt was seen again in october, 2023 for r/o torsion  CTAP (10/28/23): mildly complex loculated fluid surrounding, 10x9.6x2.5cm loculated fluid in the pelvis. Right ovary surgically absent. No evidence of LO torsion. On 10/30/23, Pt has a CT-guided drainage with aspiration of 10ml of brown fluid. Unsure of any further management done for patient during this admission    Pt seen in the ED 3/22/24 for pelvic pain and r/o ovarian torsion. TVUS (3/22/24) showed uterus 9x5.3x6.3cm with a 1.1cm hyperechoic cystic focus in the right posterior area. Myometrium heterogenous with small cystic areas within the lower uterine segment. No evidence of LO torsion, mild loculated free fluid surrounding the uterine fundus extending to b/l adnexal similar in appearance to prior study. Findings suggestive of adenomyosis and or endometriosis    Maria M Bush PGY1

## 2024-04-19 NOTE — DISCHARGE NOTE NURSING/CASE MANAGEMENT/SOCIAL WORK - NSDCPEFALRISK_GEN_ALL_CORE
For information on Fall & Injury Prevention, visit: [email or web address] OR  [email or web address] OR  https://www.cdc.gov/steadi/patient.html

## 2024-04-19 NOTE — DISCHARGE NOTE PROVIDER - CARE PROVIDER_API CALL
[facility],   [address] [place] [id number]  Ambulatory Care Unit  Oncology Building, Level C  Phone: [phone]  Fax: (   )    -  Follow Up Time: 2 weeks    [facility],   Phone: [phone]  Fax: (   )    -  Follow Up Time: 2 weeks

## 2024-04-19 NOTE — DISCHARGE NOTE PROVIDER - HOSPITAL COURSE
23y G0, LMP 3/9/2024, sent in to the ED from GYN clinic on 04/17 for diffuse abdominal pain in the setting of known endometriosis, hz of TOA, and complex surgical history. Upon presentation pt was noted to hemodynamically stable. TVUS showed a septated fluid collection measures 5.1 x 3.0 x 5.7 cm. Patient admitted for IV antibiotics and further work up of right pelvic cyst. Pt afebrile during admission.    HD#2: Pt pain well controlled. No acute overnights event. IR consulted and requesting an MRI Pelvis to evaluate for a percutaneous window for possible drainage of fluid collection. , pt was advanced to a regular diet, chowdhury was discontinued and pt was able to void spontaneously.  HD#3: No acute overnight event. MR pelvis notable for small left ovarian endometrioma, deep pelvic endometriosis and peritoneal inclusion cyst, no evidence of abscess or collection. Pt to follow up outpatient for IR drainage. Pt was discharged in stable condition, ambulating, tolerating po and voiding spontaneously. Pt advised to have close follow-up with NICOLAS JO in 2 weeks clinic.

## 2024-04-19 NOTE — PROGRESS NOTE ADULT - ATTENDING COMMENTS
Patient seen at bedside, agree with assessment and plan as stated above.  Doing well, has no complaints. Pain is well controlled on toradol. Denies nausea, vomiting, fever or chills.  Abdomen soft, non tender. Afebrile with normal vital signs.  IR consulted for further imaging recommendation. Recommend CT scan, will order. Pending results will consult IR for possible drainage.  Continue IV abx.    Josue Licea MD  Covering GYN SVC Attending
Patient seen at bedside, resting comfortably in no acute distress.  Afebrile with stable vital signs. Abdomen is soft and non tender. Leukocytosis downtrending.  Discussed MRI findings with patient, showing possible peritoneal inclusion cyst vs free fluid. Other findings suspicious of deep pelvic endometriosis.   Records reviewed from previous hospital admissions at [facility] which are consistent with current findings.  Reached out to IR who discussed potential for aspiration, less likely for drain placement.  As patient remains afebrile with no leukocytosis, pain well managed, benign physical exam, recommend outpatient follow up with IR and ACU, possibly with booking clinic.  Stable for DC home today    Josue Licea MD  Covering GYN SVC Attending

## 2024-04-19 NOTE — DISCHARGE NOTE PROVIDER - NSDCFUADDINST_GEN_ALL_CORE_FT
Discharge Instructions:  1. Diet: advance as tolerated  2. Activity limited by:   - no running, heavy lifting, strenuous exercise until cleared by MD   - nothing in vagina (bath, swim, intercourse, douching, tampons) until cleared by MD   3. Medications:   - Ibuprofen 600mg every 6 hours as needed for pain  - Acetaminophen 500mg every 6 hours as needed for pain  4. Follow-up appointment -   OBGYN Clinic - 2 weeks. Please call the office upon discharge to schedule your appointment if you do not have one already.   IR Outpatient clinic: Please follow up within 1-2weeks IR drainage of peritoneal inclusion cyst  5. Precautions:  - Call the office or go to the ED if you have any of the followin) Fever >100.4 that does not resolve  2) Intractable pain  3) Heavy bleeding

## 2024-04-19 NOTE — PROGRESS NOTE ADULT - ASSESSMENT
23y G0, LMP 3/9/2024, sent in to the ED from GYN clinic for diffuse abdominal pain in the setting of known endometriosis, hz of TOA, and complex surgical history. Patient admitted for IV antibiotics and further work up of right pelvic cyst. Pt afebrile during admission, improving clinically. Continuing IV antibiotics.     Neuro: Acetaminophen and Toradol PRN -   CV: Hemodynamically stable  - For AM CBC   Pulm: Saturating well on RA. No acute issues   GI: Regular diet  - Zofran PRN for nausea  - MiraLax and Senna for constipation  : Voiding spontaneously  Heme: HSQ for DVT ppx. SCDs while in bed   FEN: LR@125, Replete electrolytes PRN  - f/u BMP   ID:  - WBC of 13 on admission - f/u AM CBC    - Ceftriaxone/Doxy/Metronidazole (4/18-)  - GC/CT (4/17) non detected; Affirm (4/17): nondetected  - Requested records from [facility]    IR: please obtain cross-sectional imaging (MR with contrast or noncontrast CT due to history of contrast allergy) to evaluate fo percutaneous window.  F/u MR pelvis, benadryl for premedication due contrast allergy    Reproductive:   - TVUS (4/18): Uterus: Low-density posterior fundal intramural lesion likely reflecting cystic degeneration of a fibroid, measuring 3.6 x 3.4   cm. A cystic lesion in the mid uterine body measures 2.4 x 2.1 cm. Endometrium: 12 mm. Within normal limits.      Right adnexa: A septated fluid collection measures 5.1 x 3.0 x 5.7 cm. S/P R oophorectomy    - Ordered for home OCP ([facility])    Seen and evaluated with GYN team  Maria M Bush PGY1   23y G0, LMP 3/9/2024, sent in to the ED from GYN clinic for diffuse abdominal pain in the setting of known endometriosis, hz of TOA, and complex surgical history. Patient admitted for IV antibiotics and further work up of right pelvic cyst. Pt afebrile during admission, improving clinically. Continuing IV antibiotics.     Neuro: Acetaminophen and Toradol PRN -   CV: Hemodynamically stable  - For AM CBC   Pulm: Saturating well on RA. No acute issues   GI: Regular diet  - Zofran PRN for nausea  - MiraLax and Senna for constipation  : Voiding spontaneously  Heme: HSQ for DVT ppx. SCDs while in bed   FEN: LR@125, Replete electrolytes PRN  - f/u BMP   ID:  - Afebrile  - WBC: 13.43->11.51  - Ceftriaxone/Doxy/Metronidazole (4/18-)  - GC/CT (4/17) non detected; Affirm (4/17): nondetected  - Requested records from [facility]    IR: please obtain cross-sectional imaging (MR with contrast or noncontrast CT due to history of contrast allergy) to evaluate fo percutaneous window.  F/u MR pelvis, benadryl for premedication due contrast allergy    Reproductive:   - TVUS (4/18): Uterus: Low-density posterior fundal intramural lesion likely reflecting cystic degeneration of a fibroid, measuring 3.6 x 3.4   cm. A cystic lesion in the mid uterine body measures 2.4 x 2.1 cm. Endometrium: 12 mm. Within normal limits.      Right adnexa: A septated fluid collection measures 5.1 x 3.0 x 5.7 cm. S/P R oophorectomy    - Ordered for home OCP ([facility])    Seen and evaluated with GYN team  Maria M Bush PGY1

## 2024-04-19 NOTE — CHART NOTE - NSCHARTNOTEFT_GEN_A_CORE
Interventional Radiology    24yo female with endometriosis, right TOA s/p TELLY presents with abdominal pain. TVUS (4/17) demonstrated a 5.1 x 3 x 5.7 cm right adnexal fluid collection, which was further characterized with MR Pelvis (4/19). IR was initially consulted for drainage of fluid collection.    -- agree with imaging findings, case reviewed with Dr. Khan  -- IR will plan to perform CT-guided aspiration/drainage of right adnexal fluid collection on 4/23  -- NPO at midnight on 4/23  -- hold a.m. anticoagulation on 4/23  -- please complete IR pre-procedure note  -- please place IR procedure request order under Dr. Khan    --  Bryce Orellana MD PGY-3  Available on Microsoft Teams    - Non-emergent consults: Place IR consult order in [place]  - Emergent issues (pager): [facility] [phone]; [facility] [phone]; [phone]  - Scheduling questions: [facility] [phone]; [facility] [phone]  - Clinic/outpatient booking: [facility] [phone]; [facility] [phone] Interventional Radiology    24yo female with endometriosis, right TOA s/p TELLY presents with abdominal pain. TVUS (4/17) demonstrated a 5.1 x 3 x 5.7 cm right adnexal fluid collection, which was further characterized as peritoneal inclusion cyst versus pelvic free fluid on MR Pelvis (4/19). IR was initially consulted for drainage of fluid collection.    -- agree with imaging findings, case reviewed with Dr. Khan  -- patient has clinically improved since admission with resolved leukocytosis and pain  -- patient is pending discharge and preferred outpatient management  -- IR can perform CT-guided aspiration/drainage of right adnexal fluid collection on an outpatient basis  -- please contact [facility] clinic/outpatient booking ([phone])      --  Bryce Orellana MD PGY-3  Available on Microsoft Teams    - Non-emergent consults: Place IR consult order in [place]  - Emergent issues (pager): [facility] [phone]; [facility] [phone]  - Scheduling questions: [facility] [phone]; [facility] [phone]  - Clinic/outpatient booking: [facility] [phone]; [facility] [phone] Interventional Radiology    24yo female with endometriosis, right TOA s/p TELLY presents with abdominal pain. TVUS (4/17) demonstrated a 5.1 x 3 x 5.7 cm right adnexal fluid collection, which was further characterized as peritoneal inclusion cyst versus pelvic free fluid on MR Pelvis (4/19). IR was initially consulted for drainage of fluid collection.    -- agree with imaging findings, case reviewed with Dr. Khan  -- patient has clinically improved since admission with resolved leukocytosis and pain  -- patient is pending discharge and preferred outpatient management  -- IR can perform CT-GUIDED ASPIRATION/POSSIBLE DRAINAGE OF RIGHT ADNEXAL FLUID COLLECTION on an outpatient basis  -- please contact [facility] IR clinic/outpatient booking ([phone]) for scheduling      --  Bryce Orellana MD PGY-3  Available on Microsoft Teams    - Non-emergent consults: Place IR consult order in [place]  - Emergent issues (pager): [facility] [phone]; [facility] [phone]; [phone]  - Scheduling questions: [facility] [phone]; [facility] [phone]  - Clinic/outpatient booking: [facility] [phone]; [facility] [phone]

## 2024-04-22 PROBLEM — N80.9 ENDOMETRIOSIS, UNSPECIFIED: Chronic | Status: ACTIVE | Noted: 2024-04-17

## 2024-04-22 NOTE — REVIEW OF SYSTEMS
[Abdominal Pain] : abdominal pain [Constipation] : constipation [Pelvic pain] : pelvic pain [Fever] : no fever [Chills] : no chills [Dyspnea] : no dyspnea [Diarrhea] : diarrhea [Vomiting] : no vomiting [Nausea] : no nausea [Genital Rash/Irritation] : no genital rash/irritation

## 2024-04-22 NOTE — PHYSICAL EXAM
[Chaperone Present] : A chaperone was present in the examining room during all aspects of the physical examination [Appropriately responsive] : appropriately responsive [Alert] : alert [No Acute Distress] : no acute distress [Oriented x3] : oriented x3 [Normal] : normal external genitalia [Pink Rugae] : pink rugae [Discharge] : a  ~M vaginal discharge was present [Scant] : scant [White] : white [Thin] : thin [Motion Tenderness] : motion tenderness [Tenderness] : tender [FreeTextEntry7] : mildly tender to palpation in b/l lower abdomen [FreeTextEntry5] : CMT radiating to L adnexa [FreeTextEntry8] : CMT and tenderness to palpation of uterus, exam not completed due to patient discomfort

## 2024-04-22 NOTE — DISCUSSION/SUMMARY
[FreeTextEntry1] : Patient is a 23y G0 LMP "end of March" with PMH endometriosis s/p Dx LSC with dense adhesions requiring conversion to ex-lap, RSO, and left ovarian cystectomy, ADENIKE, omentectomy (2022) who presented to clinic today for second opinion on management of endometriosis after two recent hospital admissions with possible TOA vs infected endometriomas at [facility] (patient unsure and no records available for review).  Patient with complaint today of continued pelvic pain and cramping.  Patient without complaint of fevers at home and afebrile in office today.  On exam, patient with CMT and tenderness to palpation of uterus.  Plan initially discussed with patient was to obtain STI panel, CBC, TVUS, and outside medical records, and follow up in booking clinic to discuss further management.  However, while awaiting bloodwork, patient then began to complain of severe 8/10 abdominal cramping.  Given recent hospitalizations with history of prior extensive surgery and complaint of severe abdomopelvic pain, patient was referred to ED for more urgent evaluation.    - Patient escorted to ED by RN and MA  - KITTY and inpatient Gyn team updated - Endocervical GC/CT and affirm collected on exam today, f/u results - Bloodwork not collected in clinic today, would recommend CBC and STI panel be collected in ED - Recommend pelvic imaging in ED  Discussed with Dr. Miguelangel Cleaning PGY2

## 2024-04-22 NOTE — HISTORY OF PRESENT ILLNESS
[FreeTextEntry1] : Patient is a 23y G0 LMP "end of March" with PMH endometriosis s/p Dx LSC with dense adhesions requiring conversion to ex-lap, RSO, and left ovarian cystectomy, ADENIKE, omentectomy with assistance of Gyn Oncology (5/25/22) for TOA with postoperative course c/b superficial wound opening requiring home wound packing, who presents for a second opinion on endometriosis management.  Patient states she presented to [facility] in October 2023 with abdominal pain and fevers.  Patient states she was admitted to the hospital for treatment with IV antibiotics and was discharged on oral antibiotics.  She then presented again to [facility] in March 2024 with the same symptoms of abdominal pain and fevers and was admitted for 3 days for treatment of "blood mixed with infection" (infected endometrioma?) with IV antibiotics and was discharged on oral antibiotics.  She states she then followed up with her gyn at [place] ("Dr. VALENZUELA") who recommended continued management for endometriosis with [facility] vs surgical management.  Patient presents today requesting a second opinion on these options.  Today, patient with complaint of continued pelvic pain and abdominal cramping.  Also endorses constipation for the past week with small bowel movement this morning after taking an over the counter laxative.  Patient does not have records from her previous hospitalizations with her today.  Denies fevers, chills, nausea, vomiting, dysuria, vaginal discharge, chest pain, shortness of breath, diarrhea.    Obhx: G0 Gynhx: fibroids, endometriosis/TOA as above, patient has not been sexually active in approximately 1 year, denies history of STIs, states she has never had a pap smear PMH: Denies PSH: Dx LSC with dense adhesions requiring conversion to ex-lap, RSO, and left ovarian cystectomy, ADENIKE, omentectomy with assistance of Gyn Oncology (5/25/22)  Meds: [facility]tion[facility] All: IV contrast (hives)

## 2024-05-01 ENCOUNTER — OUTPATIENT (OUTPATIENT)
Dept: OUTPATIENT SERVICES | Facility: HOSPITAL | Age: 24
LOS: 1 days | End: 2024-05-01

## 2024-05-01 ENCOUNTER — APPOINTMENT (OUTPATIENT)
Dept: OBGYN | Facility: HOSPITAL | Age: 24
End: 2024-05-01
Payer: MEDICAID

## 2024-05-01 VITALS
HEART RATE: 105 BPM | SYSTOLIC BLOOD PRESSURE: 135 MMHG | TEMPERATURE: 98 F | WEIGHT: 198 LBS | DIASTOLIC BLOOD PRESSURE: 95 MMHG | BODY MASS INDEX: 39.92 KG/M2 | HEIGHT: 59 IN

## 2024-05-01 DIAGNOSIS — Z90.721 ACQUIRED ABSENCE OF OVARIES, UNILATERAL: Chronic | ICD-10-CM

## 2024-05-01 DIAGNOSIS — N80.9 ENDOMETRIOSIS, UNSPECIFIED: ICD-10-CM

## 2024-05-01 PROCEDURE — 99213 OFFICE O/P EST LOW 20 MIN: CPT | Mod: GC

## 2024-05-02 DIAGNOSIS — N80.9 ENDOMETRIOSIS, UNSPECIFIED: ICD-10-CM

## 2024-05-14 NOTE — PHYSICAL EXAM
[Chaperone Present] : A chaperone was present in the examining room during all aspects of the physical examination [Appropriately responsive] : appropriately responsive [Alert] : alert [No Acute Distress] : no acute distress [Soft] : soft [Non-tender] : non-tender [Non-distended] : non-distended [FreeTextEntry3] : Shiela [FreeTextEntry7] : small scabbed area to the left of previous vertical incison. No erythema, induration, no discharge. Likely from chaffing.

## 2024-05-14 NOTE — PLAN
[FreeTextEntry1] : 22yo G0, LMP 4/5/24 presenting for clinic for follow up following hospitalization 4/17-4/18. Found to have peritoneal inclusion cyst and small endometrioma. No severe pain at this time. Was prescribed OCPs but was not taking continuously. Does not desire further surgical management at this time.   #Endometriosis  - Was not taking OCPs continuously. Discussed medical management including taking OCPs continuously and other options including GnRH agonists/antagonists. Pt not interested at this time.  - Pt was previously taking triphasic OCPs, will send Rx for Sprintec for pt to take continuously. Recommend monophasic OCP for endometriosis management.  - Recommend ibuprofen 600mg every 6 hours, Tylenol 1000 mg every 6 hours when pt feels a flare coming on  - Pt not interested in surgical management at this time   #Peritoneal Inclusion Cyst  - Pt not currently experiencing symptoms - Gave pt number for interventional radiology if she desires consult for drainage   Lisbeth Reina, PGY3 Pt seen and examined with Dr. Jarvis PEREZ Fellow  Pt may return to clinic in 3 months for OCP check   CHRIS Fellow Addendum Agree with above. Patient is a 22yo P0 with an extensive history of endometriosis including prior ex-lap, RSO, left ovarian cystectomy, omentectomy with GYN Onc 5/25/22 c/b superficial wound dehiscence. She has had subsequent ED admissions at [facility] 2/2 pain from endometriosis and started on a tri-phasic OCP. Her last ED presentation 4/2024 imaging noting peritoneal inclusion cyst and small endometrioma and she was sent to clinic for further management. The patient states that she has some pain during menses but otherwise is manageable with Advil. Discussed imaging findings and that surgical intervention is not acutely needed at this time given symptoms. However, we recommended continuing OCPs in a continuous manner. Changed OCP to monophasic Sprintec and instructed to take in continuous fashion. To return to clinic in 3 mons for follow up. D/w Dr. Tasneem Conley, FMIGS-2

## 2024-05-14 NOTE — HISTORY OF PRESENT ILLNESS
[FreeTextEntry1] : 22yo G0, LMP 4/5/24 presenting for clinic for follow up following hospitalization 4/17-4/18. Pt was admitted to [facility] for pelvic pain in the setting of severe endometriosis. Pt underwent MRI during that hospitalization showing a peritoneal inclusion cyst and a 1.8cm endometrioma. Pt was discharged with outpatient follow up with GYN and IR. Today, pt states she has not had any additional pain beyond her normal associated with her menses. Pt takes ibuprofen and tylenol as needed for pain. Pt was prescribed OCPs by an outside provider but was not taking them in a continuous fashion. Pt does not desire additional surgical intervention at this time. Pt has not yet scheduled appt with IR for peritoneal cyst drainage consultation as she is not currently having symptoms.   Previous GYN: Dr. Candace Rizvi (in the [facility]), Dr. DAILY" at [facility]   GYN hx:  5/25/2022: Dx LSC with dense adhesions requiring conversion to ex-lap, RSO, and left ovarian cystectomy, ADENIKE, omentectomy with assistance of Gyn Oncology (5/25/22) for TOA with postoperative course c/b superficial wound opening requiring home wound packing 10/2023: Admitted to [facility] for TOA? vs. infected endometrioma? Reports being given IV abx and undergoing drainage 3/2024: Admitted to [facility] for TOA? vs. infected endometrioma? Denies being drained during this admission and took her abx thereafter  - States that her pain had resolved after her treatment there 4/17-4/18/2024: Admitted to [facility] for endometriosis pain. Improved with bowel movement. MRI was obtained showing peritoneal inclusion cyst and 1.8cm left endometrioma. Discharged with outpatient follow up.   POB: G0  PGyn: Above, Endometriosis MedHx: Eczema SurgHx: Above  Meds: Ibuprofen, Acetaminophen  Allergies: Contrast (hives) Social: Denies any tobacco use, drug use, or alcohol use

## 2024-06-26 RX ORDER — NORGESTIMATE AND ETHINYL ESTRADIOL 0.25-0.035
0.25-35 KIT ORAL
Qty: 3 | Refills: 2 | Status: ACTIVE | COMMUNITY
Start: 2024-05-01 | End: 1900-01-01

## 2024-08-01 ENCOUNTER — APPOINTMENT (OUTPATIENT)
Dept: INTERVENTIONAL RADIOLOGY/VASCULAR | Facility: CLINIC | Age: 24
End: 2024-08-01

## 2024-08-01 VITALS — HEIGHT: 59 IN | WEIGHT: 198 LBS | BODY MASS INDEX: 39.92 KG/M2

## 2024-08-01 DIAGNOSIS — R18.8 OTHER ASCITES: ICD-10-CM

## 2024-08-01 PROCEDURE — 99203 OFFICE O/P NEW LOW 30 MIN: CPT

## 2024-08-01 NOTE — REASON FOR VISIT
[Consultation] : a consultation visit [Home] : at home, [unfilled] , at the time of the visit. [Medical Office: ([facility])___] : at the medical office located in  [Patient] : the patient [Self] : self [FreeTextEntry1] : Pelvic fluid collection drainage

## 2024-08-01 NOTE — CONSULT LETTER
[Dear  ___] : Dear  [unfilled], [Consult Letter:] : I had the pleasure of evaluating your patient, [unfilled]. [( Thank you for referring [unfilled] for consultation for _____ )] : Thank you for referring [unfilled] for consultation for [unfilled] [Please see my note below.] : Please see my note below. [Consult Closing:] : Thank you very much for allowing me to participate in the care of this patient.  If you have any questions, please do not hesitate to contact me. [Sincerely,] : Sincerely, [FreeTextEntry3] : Edgar Jacobson MD, MS Vascular & Interventional Radiologist [facility]

## 2024-08-01 NOTE — PHYSICAL EXAM
[Alert] : alert [Oriented x3] : oriented to person, place, and time [Fully active, able to carry on all pre-disease performance without restriction] : Fully active, able to carry on all pre-disease performance without restriction

## 2024-08-01 NOTE — ASSESSMENT
[FreeTextEntry1] : 25 yo F with PMH significant for right salpingo-oophorectomy, endometriosis and a pelvic cyst. She was hospitalized in April with pelvic pain during which time MRI demonstrated a small left ovarian cyst/endometrioma, endometriosis, adenomyosis and small pelvic fluid. In April gynecology referred her to IR for cyst drainage, but patient became asymptomatic.  Patient states that currently she has no symptoms, and experiences pain only during her menstrual cycle. She states that she was on the "wrong OCPs" around the time when her symptoms developed, but now that she has been back to her "usual OCP," her symptoms have resolved. We discussed that her cyclical monthly pain is likely attributed to her menstrual cramps, which may be exacerbated by her known adenomyosis, endometriosis and endometrioma.   We discussed that since her symptoms have largely resolved, and also because there appears to be no drainable collection on the April MRI, there is no indication for aspiration/drainage at this time.   Patient was advised to keep her appointment with her Gynecologist, at which time it will be determined if she needs further workup/imaging for her previously experienced symptoms.

## 2024-08-01 NOTE — HISTORY OF PRESENT ILLNESS
[FreeTextEntry1] : Patient is a 24-year-old female with a past medical history significant for right salpingo-oophorectomy in 2022 for TOA, endometriosis and a pelvic cyst. She was hospitalized in April with pelvic pain when she was found to have this cyst and an endometrioma. In April gynecology referred her to IR for cyst drainage, but patient became asymptomatic. She states she developed pelvic pain during her menstrual cycles. She has now contacted IR for consultation regarding pelvic cyst drainage.   Patient denies recent fever, chills, shortness of breath, chest pain, nausea, vomiting or diarrhea.   **patient stated she had an allergic reaction to MRI contrast.

## 2024-08-01 NOTE — DATA REVIEWED
[FreeTextEntry1] : MR from 4/19/24 reviewed with the patient at length. All questions answered during the consultation.

## 2024-08-01 NOTE — CONSULT LETTER
[Dear  ___] : Dear  [unfilled], [Consult Letter:] : I had the pleasure of evaluating your patient, [unfilled]. [( Thank you for referring [unfilled] for consultation for _____ )] : Thank you for referring [unfilled] for consultation for [unfilled] [Please see my note below.] : Please see my note below. [Consult Closing:] : Thank you very much for allowing me to participate in the care of this patient.  If you have any questions, please do not hesitate to contact me. [Sincerely,] : Sincerely, [FreeTextEntry3] : Edgar Jacobson MD, MS Vascular & Interventional Radiologist [facility]

## 2024-08-01 NOTE — REASON FOR VISIT
[Consultation] : a consultation visit [Home] : at home, [unfilled] , at the time of the visit. [Medical Office: ([facility])___] : at the medical office located in  [Patient] : the patient [Self] : self [FreeTextEntry1] : Pelvic fluid collection drainage

## 2024-08-07 ENCOUNTER — APPOINTMENT (OUTPATIENT)
Dept: OBGYN | Facility: HOSPITAL | Age: 24
End: 2024-08-07

## 2024-08-07 ENCOUNTER — OUTPATIENT (OUTPATIENT)
Dept: OUTPATIENT SERVICES | Facility: HOSPITAL | Age: 24
LOS: 1 days | End: 2024-08-07

## 2024-08-07 PROCEDURE — 99213 OFFICE O/P EST LOW 20 MIN: CPT | Mod: GC

## 2024-08-08 DIAGNOSIS — N80.9 ENDOMETRIOSIS, UNSPECIFIED: ICD-10-CM

## 2024-08-14 NOTE — HISTORY OF PRESENT ILLNESS
[FreeTextEntry1] : 22yo G0, LMP 4/5/24 presenting for clinic for follow up for endometriosis.  Patient feels well today, has no acute complaints. She was seen in clinic last in May 2024 at which time she was started on continuous OCPs (Sprintec). She reports good results and has been asymptomatic since starting the continuous OCP. There was a point in early June when she "lost" her pack and missed a few days, after which she had a period that lasted 11 days and was very painful. Pt reports her pain was moderately controlled with Motrin/Tylenol at that time.  Since that time, she denies bleeding, pain, dysuria, pain with defecation. She has had two episodes of light brown spotting which are not bothersome to her. Not currently sexually active.   Previous GYN: Dr. Candace Rizvi (in the [facility]), Dr. DAILY" at [facility]  GYN hx: 5/25/2022: Dx LSC with dense adhesions requiring conversion to ex-lap, RSO, and left ovarian cystectomy, ADENIKE, omentectomy with assistance of Gyn Oncology (5/25/22) for TOA with postoperative course c/b superficial wound opening requiring home wound packing 10/2023: Admitted to [facility] for TOA? vs. infected endometrioma? Reports being given IV abx and undergoing drainage 3/2024: Admitted to [facility] for TOA? vs. infected endometrioma? Denies being drained during this admission and took her abx thereafter - States that her pain had resolved after her treatment there 4/17-4/18/2024: Admitted to [facility] for endometriosis pain. Improved with bowel movement. MRI was obtained showing peritoneal inclusion cyst and 1.8cm left endometrioma. Discharged with outpatient follow up. Clinic 5/1/24 - Started Sprintec continous OCP   POB: G0 PGyn: Above, Endometriosis MedHx: Eczema SurgHx: Above Meds: Ibuprofen, Acetaminophen Allergies: Contrast (hives) Social: Denies any tobacco use, drug use, or alcohol use Sexual: Not currently sexually active.   HCM:  no pap smear in our system, pt reports she had one elsewhere which she believes was normal

## 2024-08-14 NOTE — DISCUSSION/SUMMARY
[FreeTextEntry1] : 22yo G0, LMP 4/5/24 presenting for clinic for follow up for endometriosis. Patient started on continuous Sprintec OCPs  in May 2024 with good results, currently asymptomatic except for occasional spotting, happy with current regimen.   #endometriosis - C/w continuous Sprintec OCP, discussed recommendation for withdrawal bleeding 3-4x per year to prevent more consistent spotting. Pt counseled to skip placebo pills each month except for every 4 months, she will take the placebo pills for 1 week and then resume next pill pack. Discussed pain control with standing Motrin/Tylenol q6 hours alternating during that week in anticipation of pelvic pain with withdrawal bleed. - Pt expressed understanding, all questions answered.    Return to clinic in 3 months for annual and symptom follow up (discussed need for pap smear - patient defers exam today and would prefer to follow up for annual visit).   D/w Dr Lawrence PGY6 S Parry-Ramos PGY4  Fellow Addendum: I agree with the above resident's note, 22yo G0 presenting for endometriosis follow up visit. Currently doing well on continuous cOCPs with occasional spotting. Pt to continue this regimen at this time. Discussed withdrawal bleed q4 months. Return for annual visit.  Shiloh Lawrence MD FMIGS, PGY-6

## 2024-08-14 NOTE — HISTORY OF PRESENT ILLNESS
[FreeTextEntry1] : 24yo G0, LMP 4/5/24 presenting for clinic for follow up for endometriosis.  Patient feels well today, has no acute complaints. She was seen in clinic last in May 2024 at which time she was started on continuous OCPs (Sprintec). She reports good results and has been asymptomatic since starting the continuous OCP. There was a point in early June when she "lost" her pack and missed a few days, after which she had a period that lasted 11 days and was very painful. Pt reports her pain was moderately controlled with Motrin/Tylenol at that time.  Since that time, she denies bleeding, pain, dysuria, pain with defecation. She has had two episodes of light brown spotting which are not bothersome to her. Not currently sexually active.   Previous GYN: Dr. Candace Rizvi (in the [facility]), Dr. DAILY" at [facility]  GYN hx: 5/25/2022: Dx LSC with dense adhesions requiring conversion to ex-lap, RSO, and left ovarian cystectomy, ADENIKE, omentectomy with assistance of Gyn Oncology (5/25/22) for TOA with postoperative course c/b superficial wound opening requiring home wound packing 10/2023: Admitted to [facility] for TOA? vs. infected endometrioma? Reports being given IV abx and undergoing drainage 3/2024: Admitted to [facility] for TOA? vs. infected endometrioma? Denies being drained during this admission and took her abx thereafter - States that her pain had resolved after her treatment there 4/17-4/18/2024: Admitted to [facility] for endometriosis pain. Improved with bowel movement. MRI was obtained showing peritoneal inclusion cyst and 1.8cm left endometrioma. Discharged with outpatient follow up. Clinic 5/1/24 - Started Sprintec continous OCP   POB: G0 PGyn: Above, Endometriosis MedHx: Eczema SurgHx: Above Meds: Ibuprofen, Acetaminophen Allergies: Contrast (hives) Social: Denies any tobacco use, drug use, or alcohol use Sexual: Not currently sexually active.   HCM:  no pap smear in our system, pt reports she had one elsewhere which she believes was normal

## 2024-08-14 NOTE — HISTORY OF PRESENT ILLNESS
[FreeTextEntry1] : 24yo G0, LMP 4/5/24 presenting for clinic for follow up for endometriosis.  Patient feels well today, has no acute complaints. She was seen in clinic last in May 2024 at which time she was started on continuous OCPs (Sprintec). She reports good results and has been asymptomatic since starting the continuous OCP. There was a point in early June when she "lost" her pack and missed a few days, after which she had a period that lasted 11 days and was very painful. Pt reports her pain was moderately controlled with Motrin/Tylenol at that time.  Since that time, she denies bleeding, pain, dysuria, pain with defecation. She has had two episodes of light brown spotting which are not bothersome to her. Not currently sexually active.   Previous GYN: Dr. Candace Rizvi (in the [facility]), Dr. DAILY" at [facility]  GYN hx: 5/25/2022: Dx LSC with dense adhesions requiring conversion to ex-lap, RSO, and left ovarian cystectomy, ADENIKE, omentectomy with assistance of Gyn Oncology (5/25/22) for TOA with postoperative course c/b superficial wound opening requiring home wound packing 10/2023: Admitted to [facility] for TOA? vs. infected endometrioma? Reports being given IV abx and undergoing drainage 3/2024: Admitted to [facility] for TOA? vs. infected endometrioma? Denies being drained during this admission and took her abx thereafter - States that her pain had resolved after her treatment there 4/17-4/18/2024: Admitted to [facility] for endometriosis pain. Improved with bowel movement. MRI was obtained showing peritoneal inclusion cyst and 1.8cm left endometrioma. Discharged with outpatient follow up. Clinic 5/1/24 - Started Sprintec continous OCP   POB: G0 PGyn: Above, Endometriosis MedHx: Eczema SurgHx: Above Meds: Ibuprofen, Acetaminophen Allergies: Contrast (hives) Social: Denies any tobacco use, drug use, or alcohol use Sexual: Not currently sexually active.   HCM:  no pap smear in our system, pt reports she had one elsewhere which she believes was normal

## 2024-10-15 ENCOUNTER — APPOINTMENT (OUTPATIENT)
Dept: OBGYN | Facility: HOSPITAL | Age: 24
End: 2024-10-15

## 2024-10-15 ENCOUNTER — APPOINTMENT (OUTPATIENT)
Dept: OBGYN | Facility: HOSPITAL | Age: 24
End: 2024-10-15
Payer: MEDICAID

## 2024-10-15 ENCOUNTER — RESULT REVIEW (OUTPATIENT)
Age: 24
End: 2024-10-15

## 2024-10-15 ENCOUNTER — OUTPATIENT (OUTPATIENT)
Dept: OUTPATIENT SERVICES | Facility: HOSPITAL | Age: 24
LOS: 1 days | End: 2024-10-15

## 2024-10-15 VITALS
WEIGHT: 204 LBS | SYSTOLIC BLOOD PRESSURE: 105 MMHG | HEART RATE: 75 BPM | TEMPERATURE: 98.1 F | BODY MASS INDEX: 41.12 KG/M2 | HEIGHT: 59 IN | DIASTOLIC BLOOD PRESSURE: 66 MMHG

## 2024-10-15 DIAGNOSIS — N80.9 ENDOMETRIOSIS, UNSPECIFIED: ICD-10-CM

## 2024-10-15 DIAGNOSIS — Z90.721 ACQUIRED ABSENCE OF OVARIES, UNILATERAL: Chronic | ICD-10-CM

## 2024-10-15 DIAGNOSIS — Z00.00 ENCOUNTER FOR GENERAL ADULT MEDICAL EXAMINATION W/OUT ABNORMAL FINDINGS: ICD-10-CM

## 2024-10-15 PROCEDURE — 99213 OFFICE O/P EST LOW 20 MIN: CPT | Mod: GC,25

## 2024-10-15 PROCEDURE — 99395 PREV VISIT EST AGE 18-39: CPT | Mod: GC

## 2024-10-20 LAB — CYTOLOGY SPEC DOC CYTO: SIGNIFICANT CHANGE UP

## 2024-12-24 PROBLEM — F10.90 ALCOHOL USE: Status: ACTIVE | Noted: 2022-06-15

## 2025-02-28 ENCOUNTER — APPOINTMENT (OUTPATIENT)
Dept: OBGYN | Facility: HOSPITAL | Age: 25
End: 2025-02-28

## 2025-03-28 ENCOUNTER — APPOINTMENT (OUTPATIENT)
Dept: OBGYN | Facility: HOSPITAL | Age: 25
End: 2025-03-28

## 2025-04-04 ENCOUNTER — APPOINTMENT (OUTPATIENT)
Dept: OBGYN | Facility: HOSPITAL | Age: 25
End: 2025-04-04

## 2025-05-12 ENCOUNTER — OUTPATIENT (OUTPATIENT)
Dept: OUTPATIENT SERVICES | Facility: HOSPITAL | Age: 25
LOS: 1 days | End: 2025-05-12

## 2025-05-12 ENCOUNTER — APPOINTMENT (OUTPATIENT)
Dept: OBGYN | Facility: HOSPITAL | Age: 25
End: 2025-05-12

## 2025-05-12 VITALS
HEIGHT: 59 IN | BODY MASS INDEX: 44.35 KG/M2 | SYSTOLIC BLOOD PRESSURE: 134 MMHG | DIASTOLIC BLOOD PRESSURE: 84 MMHG | HEART RATE: 107 BPM | WEIGHT: 220 LBS

## 2025-05-12 DIAGNOSIS — Z90.721 ACQUIRED ABSENCE OF OVARIES, UNILATERAL: Chronic | ICD-10-CM

## 2025-05-12 DIAGNOSIS — R10.2 PELVIC AND PERINEAL PAIN: ICD-10-CM

## 2025-05-12 DIAGNOSIS — Z30.41 ENCOUNTER FOR SURVEILLANCE OF CONTRACEPTIVE PILLS: ICD-10-CM

## 2025-05-12 DIAGNOSIS — N80.9 ENDOMETRIOSIS, UNSPECIFIED: ICD-10-CM

## 2025-05-12 PROCEDURE — 99213 OFFICE O/P EST LOW 20 MIN: CPT | Mod: GE

## 2025-05-13 DIAGNOSIS — R10.2 PELVIC AND PERINEAL PAIN: ICD-10-CM

## 2025-05-13 DIAGNOSIS — Z30.41 ENCOUNTER FOR SURVEILLANCE OF CONTRACEPTIVE PILLS: ICD-10-CM

## 2025-05-13 DIAGNOSIS — N80.9 ENDOMETRIOSIS, UNSPECIFIED: ICD-10-CM

## (undated) DEVICE — PROTECTOR HEEL / ELBOW FLUFFY

## (undated) DEVICE — TUBING INSUFFLATION LAP FILTER 10FT

## (undated) DEVICE — CANISTER DISPOSABLE THIN WALL 3000CC

## (undated) DEVICE — SUT MONOCRYL 4-0 27" PS-2 UNDYED

## (undated) DEVICE — POSITIONER STRAP ARMBOARD VELCRO TS-30

## (undated) DEVICE — DRSG STERISTRIPS 0.5 X 4"

## (undated) DEVICE — UTERINE MANIPULATOR COOPER SURGICAL 5MM 33CM GREEN

## (undated) DEVICE — ENDOCATCH 10MM SPECIMEN POUCH

## (undated) DEVICE — SUT VICRYL 0 27" UR-6

## (undated) DEVICE — PREP BETADINE SPONGE STICKS

## (undated) DEVICE — LAP PAD W RING 18 X 18"

## (undated) DEVICE — ELCTR GROUNDING PAD ADULT COVIDIEN

## (undated) DEVICE — PACK PERI GYN

## (undated) DEVICE — TUBING OLYMPUS INSUFFLATION

## (undated) DEVICE — TIP METZENBAUM SCISSOR MONOPOLAR ENDOCUT (ORANGE)

## (undated) DEVICE — WARMING BLANKET FULL ADULT

## (undated) DEVICE — SOL IRR POUR H2O 500ML

## (undated) DEVICE — TROCAR COVIDIEN VERSAONE BLUNT TIP HASSAN 12MM

## (undated) DEVICE — BLADE SURGICAL #15 CARBON

## (undated) DEVICE — DRSG TELFA 3 X 8

## (undated) DEVICE — DRSG TEGADERM 2.5X3"

## (undated) DEVICE — TROCAR COVIDIEN VERSAPORT BLADELESS OPTICAL 5MM STANDARD

## (undated) DEVICE — Device

## (undated) DEVICE — PACK GENERAL LAPAROSCOPY

## (undated) DEVICE — BASIN SET SINGLE

## (undated) DEVICE — FOLEY TRAY 16FR 5CC LF UMETER CLOSED

## (undated) DEVICE — TROCAR COVIDIEN BLUNT TIP HASSAN 10MM STANDARD

## (undated) DEVICE — GLV 6.5 PROTEXIS (WHITE)

## (undated) DEVICE — LIGASURE IMPACT

## (undated) DEVICE — UTERINE MANIPULATOR CLINICAL INNOVATIONS CLEARVIEW 7CM

## (undated) DEVICE — TRAP SPECIMEN SPUTUM 40CC

## (undated) DEVICE — VISTASEAL DUAL APPLICATOR

## (undated) DEVICE — GOWN XL

## (undated) DEVICE — TUBING HYDRO-SURG PLUS IRRIGATOR W SMOKEVAC & PROBE

## (undated) DEVICE — DRSG OPSITE 13.75 X 4"

## (undated) DEVICE — ELCTR BOVIE PENCIL BLADE 10FT

## (undated) DEVICE — VENODYNE/SCD SLEEVE CALF MEDIUM

## (undated) DEVICE — ELCTR BOVIE TIP BLADE INSULATED 6.5" EDGE

## (undated) DEVICE — SYR LUER LOK 10CC